# Patient Record
Sex: FEMALE | Race: WHITE | NOT HISPANIC OR LATINO
[De-identification: names, ages, dates, MRNs, and addresses within clinical notes are randomized per-mention and may not be internally consistent; named-entity substitution may affect disease eponyms.]

---

## 2017-02-03 ENCOUNTER — APPOINTMENT (OUTPATIENT)
Dept: OBGYN | Facility: CLINIC | Age: 32
End: 2017-02-03

## 2017-02-13 ENCOUNTER — APPOINTMENT (OUTPATIENT)
Dept: OBGYN | Facility: CLINIC | Age: 32
End: 2017-02-13

## 2017-02-13 VITALS
WEIGHT: 220 LBS | BODY MASS INDEX: 34.53 KG/M2 | HEIGHT: 67 IN | SYSTOLIC BLOOD PRESSURE: 120 MMHG | DIASTOLIC BLOOD PRESSURE: 80 MMHG

## 2017-02-14 LAB — HPV HIGH+LOW RISK DNA PNL CVX: NEGATIVE

## 2017-02-24 LAB — CYTOLOGY CVX/VAG DOC THIN PREP: NORMAL

## 2017-07-28 ENCOUNTER — APPOINTMENT (OUTPATIENT)
Dept: OBGYN | Facility: CLINIC | Age: 32
End: 2017-07-28
Payer: COMMERCIAL

## 2017-07-28 VITALS
BODY MASS INDEX: 32.96 KG/M2 | WEIGHT: 210 LBS | DIASTOLIC BLOOD PRESSURE: 85 MMHG | SYSTOLIC BLOOD PRESSURE: 129 MMHG | HEIGHT: 67 IN | HEART RATE: 88 BPM

## 2017-07-28 PROCEDURE — 99213 OFFICE O/P EST LOW 20 MIN: CPT

## 2017-08-01 ENCOUNTER — APPOINTMENT (OUTPATIENT)
Dept: OBGYN | Facility: CLINIC | Age: 32
End: 2017-08-01
Payer: COMMERCIAL

## 2017-08-01 ENCOUNTER — ASOB RESULT (OUTPATIENT)
Age: 32
End: 2017-08-01

## 2017-08-01 PROCEDURE — 76857 US EXAM PELVIC LIMITED: CPT

## 2017-08-01 PROCEDURE — 76830 TRANSVAGINAL US NON-OB: CPT

## 2017-08-23 ENCOUNTER — APPOINTMENT (OUTPATIENT)
Dept: OBGYN | Facility: CLINIC | Age: 32
End: 2017-08-23
Payer: COMMERCIAL

## 2017-08-23 VITALS
HEIGHT: 67 IN | DIASTOLIC BLOOD PRESSURE: 79 MMHG | SYSTOLIC BLOOD PRESSURE: 123 MMHG | BODY MASS INDEX: 32.96 KG/M2 | HEART RATE: 76 BPM | WEIGHT: 210 LBS

## 2017-08-23 DIAGNOSIS — T83.32XD DISPLACEMENT OF INTRAUTERINE CONTRACEPTIVE DEVICE, SUBSEQUENT ENCOUNTER: ICD-10-CM

## 2017-08-23 PROCEDURE — 58301 REMOVE INTRAUTERINE DEVICE: CPT

## 2017-10-14 ENCOUNTER — TRANSCRIPTION ENCOUNTER (OUTPATIENT)
Age: 32
End: 2017-10-14

## 2017-10-14 ENCOUNTER — EMERGENCY (EMERGENCY)
Facility: HOSPITAL | Age: 32
LOS: 1 days | Discharge: HOME | End: 2017-10-14
Attending: EMERGENCY MEDICINE
Payer: COMMERCIAL

## 2017-10-14 VITALS
WEIGHT: 210.1 LBS | TEMPERATURE: 98 F | DIASTOLIC BLOOD PRESSURE: 84 MMHG | OXYGEN SATURATION: 99 % | RESPIRATION RATE: 20 BRPM | HEART RATE: 74 BPM | HEIGHT: 67 IN | SYSTOLIC BLOOD PRESSURE: 126 MMHG

## 2017-10-14 DIAGNOSIS — Z90.89 ACQUIRED ABSENCE OF OTHER ORGANS: Chronic | ICD-10-CM

## 2017-10-14 DIAGNOSIS — Z98.89 OTHER SPECIFIED POSTPROCEDURAL STATES: Chronic | ICD-10-CM

## 2017-10-14 LAB
ALBUMIN SERPL ELPH-MCNC: 3.8 G/DL — SIGNIFICANT CHANGE UP (ref 3.3–5.2)
ALP SERPL-CCNC: 60 U/L — SIGNIFICANT CHANGE UP (ref 40–120)
ALT FLD-CCNC: 11 U/L — SIGNIFICANT CHANGE UP
ANION GAP SERPL CALC-SCNC: 11 MMOL/L — SIGNIFICANT CHANGE UP (ref 5–17)
AST SERPL-CCNC: 16 U/L — SIGNIFICANT CHANGE UP
BILIRUB SERPL-MCNC: 0.1 MG/DL — LOW (ref 0.4–2)
BUN SERPL-MCNC: 11 MG/DL — SIGNIFICANT CHANGE UP (ref 8–20)
CALCIUM SERPL-MCNC: 8.8 MG/DL — SIGNIFICANT CHANGE UP (ref 8.6–10.2)
CHLORIDE SERPL-SCNC: 106 MMOL/L — SIGNIFICANT CHANGE UP (ref 98–107)
CO2 SERPL-SCNC: 23 MMOL/L — SIGNIFICANT CHANGE UP (ref 22–29)
CREAT SERPL-MCNC: 0.52 MG/DL — SIGNIFICANT CHANGE UP (ref 0.5–1.3)
GLUCOSE SERPL-MCNC: 103 MG/DL — SIGNIFICANT CHANGE UP (ref 70–115)
HCG UR QL: NEGATIVE — SIGNIFICANT CHANGE UP
HCT VFR BLD CALC: 38.2 % — SIGNIFICANT CHANGE UP (ref 37–47)
HGB BLD-MCNC: 12.8 G/DL — SIGNIFICANT CHANGE UP (ref 12–16)
MCHC RBC-ENTMCNC: 28.9 PG — SIGNIFICANT CHANGE UP (ref 27–31)
MCHC RBC-ENTMCNC: 33.5 G/DL — SIGNIFICANT CHANGE UP (ref 32–36)
MCV RBC AUTO: 86.2 FL — SIGNIFICANT CHANGE UP (ref 81–99)
PLATELET # BLD AUTO: 276 K/UL — SIGNIFICANT CHANGE UP (ref 150–400)
POTASSIUM SERPL-MCNC: 4.3 MMOL/L — SIGNIFICANT CHANGE UP (ref 3.5–5.3)
POTASSIUM SERPL-SCNC: 4.3 MMOL/L — SIGNIFICANT CHANGE UP (ref 3.5–5.3)
PROT SERPL-MCNC: 6.8 G/DL — SIGNIFICANT CHANGE UP (ref 6.6–8.7)
RBC # BLD: 4.43 M/UL — SIGNIFICANT CHANGE UP (ref 4.4–5.2)
RBC # FLD: 13.9 % — SIGNIFICANT CHANGE UP (ref 11–15.6)
SODIUM SERPL-SCNC: 140 MMOL/L — SIGNIFICANT CHANGE UP (ref 135–145)
WBC # BLD: 9.2 K/UL — SIGNIFICANT CHANGE UP (ref 4.8–10.8)
WBC # FLD AUTO: 9.2 K/UL — SIGNIFICANT CHANGE UP (ref 4.8–10.8)

## 2017-10-14 PROCEDURE — 99284 EMERGENCY DEPT VISIT MOD MDM: CPT | Mod: 25

## 2017-10-14 PROCEDURE — 96374 THER/PROPH/DIAG INJ IV PUSH: CPT

## 2017-10-14 PROCEDURE — 96375 TX/PRO/DX INJ NEW DRUG ADDON: CPT

## 2017-10-14 PROCEDURE — 80053 COMPREHEN METABOLIC PANEL: CPT

## 2017-10-14 PROCEDURE — 36415 COLL VENOUS BLD VENIPUNCTURE: CPT

## 2017-10-14 PROCEDURE — 81025 URINE PREGNANCY TEST: CPT

## 2017-10-14 PROCEDURE — 85027 COMPLETE CBC AUTOMATED: CPT

## 2017-10-14 RX ORDER — SODIUM CHLORIDE 9 MG/ML
1000 INJECTION INTRAMUSCULAR; INTRAVENOUS; SUBCUTANEOUS ONCE
Qty: 0 | Refills: 0 | Status: COMPLETED | OUTPATIENT
Start: 2017-10-14 | End: 2017-10-14

## 2017-10-14 RX ORDER — KETOROLAC TROMETHAMINE 30 MG/ML
30 SYRINGE (ML) INJECTION ONCE
Qty: 0 | Refills: 0 | Status: DISCONTINUED | OUTPATIENT
Start: 2017-10-14 | End: 2017-10-14

## 2017-10-14 RX ORDER — PROCHLORPERAZINE MALEATE 5 MG
10 TABLET ORAL ONCE
Qty: 0 | Refills: 0 | Status: COMPLETED | OUTPATIENT
Start: 2017-10-14 | End: 2017-10-14

## 2017-10-14 RX ADMIN — Medication 30 MILLIGRAM(S): at 07:47

## 2017-10-14 RX ADMIN — Medication 104 MILLIGRAM(S): at 07:49

## 2017-10-14 RX ADMIN — SODIUM CHLORIDE 1000 MILLILITER(S): 9 INJECTION INTRAMUSCULAR; INTRAVENOUS; SUBCUTANEOUS at 04:55

## 2017-10-14 NOTE — ED ADULT NURSE REASSESSMENT NOTE - NS ED NURSE REASSESS COMMENT FT1
pt received awake and alert sitting up in cart. pt states that the nausea has resolved. pt was given medication and wants to, leave. Dr Bailey  made aware.

## 2017-10-14 NOTE — ED ADULT NURSE NOTE - OBJECTIVE STATEMENT
Assumed pt care @ 0415. Pt sitting in stretcher in NAD. Pt c/o a headache which worsens upon mobility w/ nausea since 2200. Pt took 1gm ibuprofen @ 0000. Pt states this has been going on for years with no results. Denies being dx with migraines. Pt currently appears comfortable. NUNU Minaya @ bedside assessing pt. STACY dao.

## 2017-10-14 NOTE — ED PROVIDER NOTE - NEUROLOGICAL, MLM
No pronator drift. Normal cerebellar function. Alert and oriented, no focal deficits, no motor or sensory deficits.

## 2017-10-14 NOTE — ED PROVIDER NOTE - ATTENDING CONTRIBUTION TO CARE
32y old from home with complaints of headache, no neuro deficits, has had similar headache before, plan hydration, urine, labs, meds and re evaluate

## 2017-10-14 NOTE — ED PROVIDER NOTE - OBJECTIVE STATEMENT
33 y/o female with PMHx depression (on antidepressants) presents to the ED with c/o headache, onset 2200 yesterday. Per pt has had headaches for the past 2 years. Pt states she has had MRI in the past, pt has been evaluated by neurologist and ENT. Attempted to alleviate with NSAIDs without relief. Pt describes headache as pressure like sensation, described as bandlike. Per pt loud sounds exacerbates the pain. Reports chills and nausea, but denies photophobia, changes in vision, recent travel, fever, and any other acute symptoms and complaints at this time.  LMP: 9/22/2017

## 2017-10-14 NOTE — ED PROVIDER NOTE - PROGRESS NOTE DETAILS
pt signed out to me by NUNU Paulino and Dr. Garcia - as per both of them, pt is to be medicated and discharged

## 2017-10-14 NOTE — ED ADULT NURSE NOTE - FAMILY HISTORY
Father  Still living? Yes, Estimated age: Age Unknown  Family history of hypertension in father, Age at diagnosis: Age Unknown

## 2017-10-14 NOTE — ED ADULT NURSE NOTE - PSH
H/O right knee surgery  ACL reconstruction 7/25/16  History of oral surgery  wisdom teeth removed 1997  S/P tonsillectomy  1990

## 2017-10-24 NOTE — ED ADULT NURSE NOTE - CAS TRG GEN SKIN CONDITION
Patient called requesting a medication refill.    omeprazole (PRILOSEC) 20 MG capsule 90 capsule 3 9/22/2017       Last ov (mwv): 9/25/17  Last refill: 9/22/17    Pharmacy has been set up   Warm

## 2018-01-11 ENCOUNTER — TRANSCRIPTION ENCOUNTER (OUTPATIENT)
Age: 33
End: 2018-01-11

## 2018-03-15 ENCOUNTER — APPOINTMENT (OUTPATIENT)
Dept: OBGYN | Facility: CLINIC | Age: 33
End: 2018-03-15
Payer: COMMERCIAL

## 2018-03-15 VITALS
HEIGHT: 67 IN | DIASTOLIC BLOOD PRESSURE: 72 MMHG | SYSTOLIC BLOOD PRESSURE: 118 MMHG | BODY MASS INDEX: 32.96 KG/M2 | WEIGHT: 210 LBS

## 2018-03-15 DIAGNOSIS — Z01.419 ENCOUNTER FOR GYNECOLOGICAL EXAMINATION (GENERAL) (ROUTINE) W/OUT ABNORMAL FINDINGS: ICD-10-CM

## 2018-03-15 PROCEDURE — 99395 PREV VISIT EST AGE 18-39: CPT

## 2018-03-19 LAB
CYTOLOGY CVX/VAG DOC THIN PREP: NORMAL
HPV HIGH+LOW RISK DNA PNL CVX: NOT DETECTED

## 2018-04-09 ENCOUNTER — APPOINTMENT (OUTPATIENT)
Dept: OBGYN | Facility: CLINIC | Age: 33
End: 2018-04-09
Payer: COMMERCIAL

## 2018-04-09 VITALS
HEIGHT: 67 IN | DIASTOLIC BLOOD PRESSURE: 78 MMHG | SYSTOLIC BLOOD PRESSURE: 116 MMHG | WEIGHT: 210 LBS | BODY MASS INDEX: 32.96 KG/M2

## 2018-04-09 DIAGNOSIS — N90.89 OTHER SPECIFIED NONINFLAMMATORY DISORDERS OF VULVA AND PERINEUM: ICD-10-CM

## 2018-04-09 PROCEDURE — 99213 OFFICE O/P EST LOW 20 MIN: CPT

## 2018-09-17 ENCOUNTER — APPOINTMENT (OUTPATIENT)
Dept: OBGYN | Facility: CLINIC | Age: 33
End: 2018-09-17
Payer: COMMERCIAL

## 2018-09-17 DIAGNOSIS — Z32.01 ENCOUNTER FOR PREGNANCY TEST, RESULT POSITIVE: ICD-10-CM

## 2018-09-17 PROCEDURE — 76817 TRANSVAGINAL US OBSTETRIC: CPT

## 2018-09-17 PROCEDURE — 81025 URINE PREGNANCY TEST: CPT

## 2018-09-17 PROCEDURE — 99213 OFFICE O/P EST LOW 20 MIN: CPT

## 2018-09-25 ENCOUNTER — APPOINTMENT (OUTPATIENT)
Dept: OBGYN | Facility: CLINIC | Age: 33
End: 2018-09-25
Payer: COMMERCIAL

## 2018-09-25 ENCOUNTER — NON-APPOINTMENT (OUTPATIENT)
Age: 33
End: 2018-09-25

## 2018-09-25 ENCOUNTER — ASOB RESULT (OUTPATIENT)
Age: 33
End: 2018-09-25

## 2018-09-25 ENCOUNTER — APPOINTMENT (OUTPATIENT)
Dept: ANTEPARTUM | Facility: CLINIC | Age: 33
End: 2018-09-25
Payer: COMMERCIAL

## 2018-09-25 VITALS
SYSTOLIC BLOOD PRESSURE: 110 MMHG | BODY MASS INDEX: 34.84 KG/M2 | DIASTOLIC BLOOD PRESSURE: 64 MMHG | WEIGHT: 222 LBS | HEIGHT: 67 IN

## 2018-09-25 PROCEDURE — 0501F PRENATAL FLOW SHEET: CPT

## 2018-09-25 PROCEDURE — 76817 TRANSVAGINAL US OBSTETRIC: CPT

## 2018-10-05 ENCOUNTER — LABORATORY RESULT (OUTPATIENT)
Age: 33
End: 2018-10-05

## 2018-10-22 ENCOUNTER — APPOINTMENT (OUTPATIENT)
Dept: OBGYN | Facility: CLINIC | Age: 33
End: 2018-10-22
Payer: COMMERCIAL

## 2018-10-22 VITALS
BODY MASS INDEX: 35 KG/M2 | SYSTOLIC BLOOD PRESSURE: 108 MMHG | HEIGHT: 67 IN | DIASTOLIC BLOOD PRESSURE: 74 MMHG | WEIGHT: 223 LBS

## 2018-10-22 DIAGNOSIS — F17.200 NICOTINE DEPENDENCE, UNSPECIFIED, UNCOMPLICATED: ICD-10-CM

## 2018-10-22 DIAGNOSIS — Z86.79 PERSONAL HISTORY OF OTHER DISEASES OF THE CIRCULATORY SYSTEM: ICD-10-CM

## 2018-10-22 DIAGNOSIS — Z20.2 CONTACT WITH AND (SUSPECTED) EXPOSURE TO INFECTIONS WITH A PREDOMINANTLY SEXUAL MODE OF TRANSMISSION: ICD-10-CM

## 2018-10-22 DIAGNOSIS — Z86.59 PERSONAL HISTORY OF OTHER MENTAL AND BEHAVIORAL DISORDERS: ICD-10-CM

## 2018-10-22 PROCEDURE — 0502F SUBSEQUENT PRENATAL CARE: CPT

## 2018-10-22 PROCEDURE — 90686 IIV4 VACC NO PRSV 0.5 ML IM: CPT

## 2018-10-22 PROCEDURE — 90471 IMMUNIZATION ADMIN: CPT

## 2018-10-29 ENCOUNTER — APPOINTMENT (OUTPATIENT)
Dept: ANTEPARTUM | Facility: CLINIC | Age: 33
End: 2018-10-29
Payer: COMMERCIAL

## 2018-10-29 ENCOUNTER — ASOB RESULT (OUTPATIENT)
Age: 33
End: 2018-10-29

## 2018-10-29 PROCEDURE — 76813 OB US NUCHAL MEAS 1 GEST: CPT

## 2018-10-29 PROCEDURE — 36416 COLLJ CAPILLARY BLOOD SPEC: CPT

## 2018-11-01 LAB
1ST TRIMESTER DATA: NORMAL
ADDENDUM DOC: NORMAL
AFP PNL SERPL: NORMAL
AFP SERPL-ACNC: NORMAL
CLINICAL BIOCHEMIST REVIEW: NORMAL
FREE BETA HCG 1ST TRIMESTER: NORMAL
Lab: NORMAL
NOTES NTD: NORMAL
NT: NORMAL
PAPP-A SERPL-ACNC: NORMAL
TRISOMY 18/3: NORMAL

## 2018-11-11 NOTE — ED ADULT NURSE NOTE - NS ED NURSE LEVEL OF CONSCIOUSNESS MENTAL STATUS
Pt resting in bed, reports to have continued dizziness when moving from sitting to standing. Pt c/o increased lower abdominal pain when having to urinate, and will have occasional cramping she has to breathe through. Advised will update MD.   
Alert/Awake

## 2018-11-19 ENCOUNTER — APPOINTMENT (OUTPATIENT)
Dept: OBGYN | Facility: CLINIC | Age: 33
End: 2018-11-19
Payer: COMMERCIAL

## 2018-11-19 VITALS
WEIGHT: 223 LBS | SYSTOLIC BLOOD PRESSURE: 102 MMHG | DIASTOLIC BLOOD PRESSURE: 60 MMHG | HEIGHT: 67 IN | BODY MASS INDEX: 35 KG/M2

## 2018-11-19 PROCEDURE — 0502F SUBSEQUENT PRENATAL CARE: CPT

## 2018-11-30 ENCOUNTER — LABORATORY RESULT (OUTPATIENT)
Age: 33
End: 2018-11-30

## 2018-12-17 ENCOUNTER — APPOINTMENT (OUTPATIENT)
Dept: ANTEPARTUM | Facility: CLINIC | Age: 33
End: 2018-12-17
Payer: COMMERCIAL

## 2018-12-17 ENCOUNTER — ASOB RESULT (OUTPATIENT)
Age: 33
End: 2018-12-17

## 2018-12-17 ENCOUNTER — APPOINTMENT (OUTPATIENT)
Dept: OBGYN | Facility: CLINIC | Age: 33
End: 2018-12-17
Payer: COMMERCIAL

## 2018-12-17 VITALS
DIASTOLIC BLOOD PRESSURE: 70 MMHG | WEIGHT: 226 LBS | BODY MASS INDEX: 35.47 KG/M2 | SYSTOLIC BLOOD PRESSURE: 110 MMHG | HEIGHT: 67 IN

## 2018-12-17 PROCEDURE — 76817 TRANSVAGINAL US OBSTETRIC: CPT

## 2018-12-17 PROCEDURE — 59425 ANTEPARTUM CARE ONLY: CPT

## 2018-12-17 PROCEDURE — 0502F SUBSEQUENT PRENATAL CARE: CPT

## 2018-12-17 PROCEDURE — 76811 OB US DETAILED SNGL FETUS: CPT

## 2019-01-09 ENCOUNTER — APPOINTMENT (OUTPATIENT)
Dept: PEDIATRIC CARDIOLOGY | Facility: CLINIC | Age: 34
End: 2019-01-09

## 2019-01-14 ENCOUNTER — APPOINTMENT (OUTPATIENT)
Dept: OBGYN | Facility: CLINIC | Age: 34
End: 2019-01-14

## 2019-02-11 ENCOUNTER — APPOINTMENT (OUTPATIENT)
Dept: ANTEPARTUM | Facility: CLINIC | Age: 34
End: 2019-02-11

## 2019-08-13 ENCOUNTER — APPOINTMENT (OUTPATIENT)
Dept: OTOLARYNGOLOGY | Facility: CLINIC | Age: 34
End: 2019-08-13
Payer: COMMERCIAL

## 2019-08-13 VITALS
SYSTOLIC BLOOD PRESSURE: 126 MMHG | DIASTOLIC BLOOD PRESSURE: 68 MMHG | HEART RATE: 94 BPM | WEIGHT: 226 LBS | HEIGHT: 67 IN | BODY MASS INDEX: 35.47 KG/M2

## 2019-08-13 PROCEDURE — 99213 OFFICE O/P EST LOW 20 MIN: CPT

## 2019-08-13 RX ORDER — LEVONORGESTREL AND ETHINYL ESTRADIOL 0.1-0.02MG
0.1-2 KIT ORAL DAILY
Qty: 90 | Refills: 3 | Status: COMPLETED | COMMUNITY
Start: 2018-03-15 | End: 2019-08-13

## 2019-08-13 NOTE — HISTORY OF PRESENT ILLNESS
[de-identified] : Hx of L buccal dysplasia.  Pt states the L buccal area is feeling worse.  She feels pain when eating hard foods.  She first noticed this during her recent pregnancy.  The area gets more tender after brushing and flossing her teeth.  She denies any dyspnea, dysphagia, otalgia.

## 2019-08-13 NOTE — PHYSICAL EXAM
[FreeTextEntry1] : Lichenoid changes along the L. maxillary alveolus and mandibular alveolus.  The areas are flat, not ulcerated and with no TTP. [Normal] : no rashes

## 2019-08-13 NOTE — DATA REVIEWED
[de-identified] : Pathology from 2016 reported squamous mucosa with hyperkeratosis, mild dysplasia and lichenoid mucositis. \par

## 2019-10-28 ENCOUNTER — APPOINTMENT (OUTPATIENT)
Dept: OTOLARYNGOLOGY | Facility: CLINIC | Age: 34
End: 2019-10-28
Payer: COMMERCIAL

## 2019-10-28 VITALS
HEART RATE: 64 BPM | TEMPERATURE: 98 F | BODY MASS INDEX: 35.36 KG/M2 | WEIGHT: 225.3 LBS | HEIGHT: 67 IN | SYSTOLIC BLOOD PRESSURE: 118 MMHG | DIASTOLIC BLOOD PRESSURE: 80 MMHG

## 2019-10-28 VITALS
HEIGHT: 67 IN | DIASTOLIC BLOOD PRESSURE: 80 MMHG | BODY MASS INDEX: 35.31 KG/M2 | SYSTOLIC BLOOD PRESSURE: 118 MMHG | HEART RATE: 64 BPM | WEIGHT: 225 LBS | TEMPERATURE: 98.3 F

## 2019-10-28 PROCEDURE — 99214 OFFICE O/P EST MOD 30 MIN: CPT

## 2019-10-28 NOTE — HISTORY OF PRESENT ILLNESS
[de-identified] : Hx of L buccal dysplasia. Pt states the L buccal area is feeling worse. She feels pain when eating hard foods. She first noticed this during her recent pregnancy. The area gets more tender after brushing and flossing her teeth. She denies any dyspnea, dysphagia, otalgia.  She was evaluated by Dr. Noe and biopsy showed moderate dysplasia.  She return to discuss reexcision of this area.\par

## 2019-11-14 ENCOUNTER — OUTPATIENT (OUTPATIENT)
Dept: OUTPATIENT SERVICES | Facility: HOSPITAL | Age: 34
LOS: 1 days | End: 2019-11-14
Payer: COMMERCIAL

## 2019-11-14 VITALS
TEMPERATURE: 97 F | HEART RATE: 63 BPM | WEIGHT: 225.09 LBS | OXYGEN SATURATION: 98 % | SYSTOLIC BLOOD PRESSURE: 126 MMHG | DIASTOLIC BLOOD PRESSURE: 74 MMHG | HEIGHT: 67 IN | RESPIRATION RATE: 16 BRPM

## 2019-11-14 DIAGNOSIS — K13.70 UNSPECIFIED LESIONS OF ORAL MUCOSA: ICD-10-CM

## 2019-11-14 DIAGNOSIS — Z98.89 OTHER SPECIFIED POSTPROCEDURAL STATES: Chronic | ICD-10-CM

## 2019-11-14 DIAGNOSIS — Z87.19 PERSONAL HISTORY OF OTHER DISEASES OF THE DIGESTIVE SYSTEM: Chronic | ICD-10-CM

## 2019-11-14 DIAGNOSIS — Z90.89 ACQUIRED ABSENCE OF OTHER ORGANS: Chronic | ICD-10-CM

## 2019-11-14 LAB
ANION GAP SERPL CALC-SCNC: 15 MMO/L — HIGH (ref 7–14)
BUN SERPL-MCNC: 16 MG/DL — SIGNIFICANT CHANGE UP (ref 7–23)
CALCIUM SERPL-MCNC: 9.6 MG/DL — SIGNIFICANT CHANGE UP (ref 8.4–10.5)
CHLORIDE SERPL-SCNC: 98 MMOL/L — SIGNIFICANT CHANGE UP (ref 98–107)
CO2 SERPL-SCNC: 23 MMOL/L — SIGNIFICANT CHANGE UP (ref 22–31)
CREAT SERPL-MCNC: 0.69 MG/DL — SIGNIFICANT CHANGE UP (ref 0.5–1.3)
GLUCOSE SERPL-MCNC: 79 MG/DL — SIGNIFICANT CHANGE UP (ref 70–99)
HCT VFR BLD CALC: 43.9 % — SIGNIFICANT CHANGE UP (ref 34.5–45)
HGB BLD-MCNC: 14.5 G/DL — SIGNIFICANT CHANGE UP (ref 11.5–15.5)
MCHC RBC-ENTMCNC: 29.4 PG — SIGNIFICANT CHANGE UP (ref 27–34)
MCHC RBC-ENTMCNC: 33 % — SIGNIFICANT CHANGE UP (ref 32–36)
MCV RBC AUTO: 89 FL — SIGNIFICANT CHANGE UP (ref 80–100)
NRBC # FLD: 0 K/UL — SIGNIFICANT CHANGE UP (ref 0–0)
PLATELET # BLD AUTO: 311 K/UL — SIGNIFICANT CHANGE UP (ref 150–400)
PMV BLD: 10.5 FL — SIGNIFICANT CHANGE UP (ref 7–13)
POTASSIUM SERPL-MCNC: 3.7 MMOL/L — SIGNIFICANT CHANGE UP (ref 3.5–5.3)
POTASSIUM SERPL-SCNC: 3.7 MMOL/L — SIGNIFICANT CHANGE UP (ref 3.5–5.3)
RBC # BLD: 4.93 M/UL — SIGNIFICANT CHANGE UP (ref 3.8–5.2)
RBC # FLD: 13.5 % — SIGNIFICANT CHANGE UP (ref 10.3–14.5)
SODIUM SERPL-SCNC: 136 MMOL/L — SIGNIFICANT CHANGE UP (ref 135–145)
WBC # BLD: 9.25 K/UL — SIGNIFICANT CHANGE UP (ref 3.8–10.5)
WBC # FLD AUTO: 9.25 K/UL — SIGNIFICANT CHANGE UP (ref 3.8–10.5)

## 2019-11-14 PROCEDURE — 93010 ELECTROCARDIOGRAM REPORT: CPT

## 2019-11-14 RX ORDER — SODIUM CHLORIDE 9 MG/ML
1000 INJECTION, SOLUTION INTRAVENOUS
Refills: 0 | Status: DISCONTINUED | OUTPATIENT
Start: 2019-11-20 | End: 2019-12-18

## 2019-11-14 NOTE — H&P PST ADULT - NSICDXPASTSURGICALHX_GEN_ALL_CORE_FT
PAST SURGICAL HISTORY:  H/O right knee surgery ACL reconstruction 7/25/16    History of oral lesions 2016    History of oral surgery wisdom teeth removed 1997    S/P tonsillectomy 1990

## 2019-11-14 NOTE — H&P PST ADULT - NEGATIVE BREAST SYMPTOMS
no breast tenderness L/no nipple discharge R/no breast lump L/no breast tenderness R/no nipple discharge L

## 2019-11-14 NOTE — H&P PST ADULT - NSICDXFAMILYHX_GEN_ALL_CORE_FT
FAMILY HISTORY:  Father  Still living? Yes, Estimated age: Age Unknown  Family history of hypertension in father, Age at diagnosis: Age Unknown

## 2019-11-14 NOTE — H&P PST ADULT - NEGATIVE FEMALE-SPECIFIC SYMPTOMS
no irregular menses/no pelvic pain/no amenorrhea/no dysmenorrhea/no menorrhagia/no abnormal vaginal bleeding

## 2019-11-14 NOTE — H&P PST ADULT - NEGATIVE GENERAL GENITOURINARY SYMPTOMS
no renal colic/no flank pain L/no dysuria/no urine discoloration/no bladder infections/no urinary hesitancy/no nocturia/normal urinary frequency/no hematuria/no incontinence/no flank pain R

## 2019-11-14 NOTE — H&P PST ADULT - NEGATIVE ENMT SYMPTOMS
no nasal discharge/no hearing difficulty/no sinus symptoms/no nose bleeds/no vertigo/no nasal congestion/no ear pain/no tinnitus/no dysphagia/no gum bleeding/no throat pain

## 2019-11-14 NOTE — H&P PST ADULT - NEGATIVE NEUROLOGICAL SYMPTOMS
no tremors/no facial palsy/no paresthesias/no confusion/no transient paralysis/no weakness/no syncope/no headache/no focal seizures/no generalized seizures/no vertigo/no loss of sensation/no loss of consciousness/no hemiparesis/no difficulty walking

## 2019-11-14 NOTE — H&P PST ADULT - NEGATIVE GENERAL SYMPTOMS
no sweating/no anorexia/no chills/no weight loss/no polyphagia/no fatigue/no weight gain/no polyuria/no polydipsia/no malaise/no fever

## 2019-11-14 NOTE — H&P PST ADULT - NSICDXPROBLEM_GEN_ALL_CORE_FT
PROBLEM DIAGNOSES  Problem: Unspecified lesions of oral mucosa  Assessment and Plan:  Scheduled for direct laryngoscopy, esophagoscopy resection of oral lesion on 11/20/19  preop instructions, gi prophylaxis given  pt verbalized understanding  ucg on admit

## 2019-11-14 NOTE — H&P PST ADULT - NEGATIVE OPHTHALMOLOGIC SYMPTOMS
no lacrimation L/no lacrimation R/no diplopia/no discharge R/no irritation L/no loss of vision R/no scleral injection R/no blurred vision R/no blurred vision L/no photophobia/no pain R/no irritation R/no loss of vision L/no scleral injection L/no discharge L/no pain L

## 2019-11-14 NOTE — H&P PST ADULT - NEGATIVE MUSCULOSKELETAL SYMPTOMS
no leg pain L/no muscle weakness/no joint swelling/no back pain/no arthritis/no arm pain R/no myalgia/no muscle cramps/no neck pain/no arm pain L

## 2019-11-14 NOTE — H&P PST ADULT - HISTORY OF PRESENT ILLNESS
34 yr old obese female, h/o depression, presents for pre-op exam for recurrent oral lesion.   H/o excision of oral lesion, possible skin graft on 9/22/16.   Pt reports h/o oral lesion upper left jaw for the past year, c/o mild discomfort.  Scheduled for direct laryngoscopy, esophagoscopy resection of oral lesion on 11/20/19

## 2019-11-19 ENCOUNTER — TRANSCRIPTION ENCOUNTER (OUTPATIENT)
Age: 34
End: 2019-11-19

## 2019-11-19 NOTE — ASU PATIENT PROFILE, ADULT - PSH
H/O right knee surgery  ACL reconstruction 7/25/16  History of oral lesions  2016  History of oral surgery  wisdom teeth removed 1997  S/P tonsillectomy  1990

## 2019-11-20 ENCOUNTER — OUTPATIENT (OUTPATIENT)
Dept: OUTPATIENT SERVICES | Facility: HOSPITAL | Age: 34
LOS: 1 days | Discharge: HOME | End: 2019-11-20
Payer: COMMERCIAL

## 2019-11-20 ENCOUNTER — APPOINTMENT (OUTPATIENT)
Dept: OTOLARYNGOLOGY | Facility: HOSPITAL | Age: 34
End: 2019-11-20

## 2019-11-20 VITALS
HEIGHT: 67 IN | SYSTOLIC BLOOD PRESSURE: 128 MMHG | OXYGEN SATURATION: 97 % | HEART RATE: 62 BPM | RESPIRATION RATE: 15 BRPM | WEIGHT: 225.09 LBS | DIASTOLIC BLOOD PRESSURE: 51 MMHG | TEMPERATURE: 98 F

## 2019-11-20 VITALS
SYSTOLIC BLOOD PRESSURE: 129 MMHG | RESPIRATION RATE: 17 BRPM | OXYGEN SATURATION: 96 % | HEART RATE: 67 BPM | DIASTOLIC BLOOD PRESSURE: 59 MMHG

## 2019-11-20 DIAGNOSIS — K13.70 UNSPECIFIED LESIONS OF ORAL MUCOSA: ICD-10-CM

## 2019-11-20 DIAGNOSIS — Z90.89 ACQUIRED ABSENCE OF OTHER ORGANS: Chronic | ICD-10-CM

## 2019-11-20 DIAGNOSIS — Z87.19 PERSONAL HISTORY OF OTHER DISEASES OF THE DIGESTIVE SYSTEM: Chronic | ICD-10-CM

## 2019-11-20 DIAGNOSIS — Z98.89 OTHER SPECIFIED POSTPROCEDURAL STATES: Chronic | ICD-10-CM

## 2019-11-20 LAB — HCG UR QL: NEGATIVE — SIGNIFICANT CHANGE UP

## 2019-11-20 PROCEDURE — 31525 DX LARYNGOSCOPY EXCL NB: CPT | Mod: GC,59

## 2019-11-20 PROCEDURE — 40810 EXCISION OF MOUTH LESION: CPT | Mod: GC

## 2019-11-20 PROCEDURE — 43191 ESOPHAGOSCOPY RIGID TRNSO DX: CPT | Mod: GC

## 2019-11-20 RX ORDER — ONDANSETRON 8 MG/1
4 TABLET, FILM COATED ORAL ONCE
Refills: 0 | Status: DISCONTINUED | OUTPATIENT
Start: 2019-11-20 | End: 2019-12-18

## 2019-11-20 RX ORDER — OXYCODONE HYDROCHLORIDE 5 MG/1
5 TABLET ORAL ONCE
Refills: 0 | Status: DISCONTINUED | OUTPATIENT
Start: 2019-11-20 | End: 2019-11-20

## 2019-11-20 RX ORDER — CHLORHEXIDINE GLUCONATE 213 G/1000ML
5 SOLUTION TOPICAL
Qty: 200 | Refills: 0
Start: 2019-11-20

## 2019-11-20 RX ORDER — FENTANYL CITRATE 50 UG/ML
25 INJECTION INTRAVENOUS
Refills: 0 | Status: DISCONTINUED | OUTPATIENT
Start: 2019-11-20 | End: 2019-11-20

## 2019-11-20 NOTE — ASU DISCHARGE PLAN (ADULT/PEDIATRIC) - CALL YOUR DOCTOR IF YOU HAVE ANY OF THE FOLLOWING:
Bleeding that does not stop Pain not relieved by Medications/Bleeding that does not stop/Inability to tolerate liquids or foods/Fever greater than (need to indicate Fahrenheit or Celsius)

## 2019-11-20 NOTE — ASU DISCHARGE PLAN (ADULT/PEDIATRIC) - CARE PROVIDER_API CALL
Lowell Garcia)  Otolaryngology  [address] [place] [address]  Phone: [phone]  Fax: [phone]  Follow Up Time:

## 2019-11-20 NOTE — ASU DISCHARGE PLAN (ADULT/PEDIATRIC) - ASU DC SPECIAL INSTRUCTIONSFT
diet as described above  percocet as needed for pain  peridex mouth rinse three times a day  follow up in clinic next week as scheduled [phone]

## 2019-11-21 ENCOUNTER — RESULT REVIEW (OUTPATIENT)
Age: 34
End: 2019-11-21

## 2019-11-21 PROCEDURE — 88305 TISSUE EXAM BY PATHOLOGIST: CPT | Mod: 26

## 2019-11-25 ENCOUNTER — APPOINTMENT (OUTPATIENT)
Dept: OTOLARYNGOLOGY | Facility: CLINIC | Age: 34
End: 2019-11-25
Payer: COMMERCIAL

## 2019-11-25 VITALS
HEIGHT: 67 IN | HEART RATE: 58 BPM | DIASTOLIC BLOOD PRESSURE: 74 MMHG | WEIGHT: 225 LBS | SYSTOLIC BLOOD PRESSURE: 111 MMHG | BODY MASS INDEX: 35.31 KG/M2

## 2019-11-25 PROCEDURE — 99024 POSTOP FOLLOW-UP VISIT: CPT

## 2019-11-25 NOTE — HISTORY OF PRESENT ILLNESS
[de-identified] : Hx of L buccal dysplasia. Pt states the L buccal area is feeling worse. She feels pain when eating hard foods. She first noticed this during her recent pregnancy. The area gets more tender after brushing and flossing her teeth. She was evaluated by Dr. Noe and biopsy showed moderate dysplasia.  pt is here post reexcision of oral lesion on 11/20/2019 and pt has no c.o post procedure.  She denies any dyspnea, dysphagia, otalgia

## 2019-11-25 NOTE — DATA REVIEWED
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

## 2019-11-25 NOTE — PHYSICAL EXAM
[Normal] : orientation to person, place, and time: normal [FreeTextEntry1] : Lichenoid changes along the L. mandibular alveolus.  The area of recent excision along the L. maxillary gingiva are healing well.  No new lesions.  The areas are flat, not ulcerated and with no TTP.

## 2019-11-27 NOTE — H&P PST ADULT - NS PRO FEM REPRO HEALTH SCREEN
Spoke with patient she was notified of refill. Also booked next appointment for Dec 20th.   breast self-exam

## 2020-03-31 ENCOUNTER — APPOINTMENT (OUTPATIENT)
Dept: OTOLARYNGOLOGY | Facility: CLINIC | Age: 35
End: 2020-03-31

## 2020-05-03 ENCOUNTER — APPOINTMENT (OUTPATIENT)
Dept: OTOLARYNGOLOGY | Facility: CLINIC | Age: 35
End: 2020-05-03

## 2020-05-05 ENCOUNTER — TRANSCRIPTION ENCOUNTER (OUTPATIENT)
Age: 35
End: 2020-05-05

## 2020-05-07 ENCOUNTER — APPOINTMENT (OUTPATIENT)
Dept: NEUROSURGERY | Facility: CLINIC | Age: 35
End: 2020-05-07

## 2020-09-08 ENCOUNTER — APPOINTMENT (OUTPATIENT)
Dept: OTOLARYNGOLOGY | Facility: CLINIC | Age: 35
End: 2020-09-08
Payer: COMMERCIAL

## 2020-09-08 VITALS
SYSTOLIC BLOOD PRESSURE: 130 MMHG | HEIGHT: 67 IN | DIASTOLIC BLOOD PRESSURE: 84 MMHG | WEIGHT: 225 LBS | HEART RATE: 66 BPM | BODY MASS INDEX: 35.31 KG/M2 | TEMPERATURE: 98.2 F

## 2020-09-08 PROCEDURE — 99214 OFFICE O/P EST MOD 30 MIN: CPT | Mod: 25

## 2020-09-08 PROCEDURE — 31575 DIAGNOSTIC LARYNGOSCOPY: CPT

## 2020-09-08 RX ORDER — LEVONORGESTREL AND ETHINYL ESTRADIOL 0.1-0.02MG
0.1-2 KIT ORAL DAILY
Qty: 90 | Refills: 3 | Status: COMPLETED | COMMUNITY
Start: 2017-08-23 | End: 2020-09-08

## 2020-09-08 NOTE — PHYSICAL EXAM
[Laryngoscopy Performed] : laryngoscopy was performed, see procedure section for findings [FreeTextEntry1] : Lichenoid changes along the L. mandibular alveolus.  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   [Normal] : no rashes

## 2020-09-08 NOTE — PROCEDURE
[Unable to Cooperate with Mirror] : patient unable to cooperate with mirror [None] : none [Flexible Endoscope] : examined with the flexible endoscope [Serial Number: ___] : Serial Number: [unfilled] [de-identified] : No lesions in the NPx, OPx, HPx or larynx.  VC are mobile, airway patent.\par

## 2020-09-08 NOTE — HISTORY OF PRESENT ILLNESS
[de-identified] : Hx of L buccal dysplasia. Pt states the L buccal area is feeling worse. She feels pain when eating hard foods. She first noticed this during her recent pregnancy. The area gets more tender after brushing and flossing her teeth. She was evaluated by Dr. Noe and biopsy showed moderate dysplasia.  pt is here post reexcision of oral lesion on 11/20/2019 atypical verrucous hyperplasia and today f/u , c.o more pain left side of the mouth and slow increase in size of the lesion about 4 - 5 months, no bleeding.  She denies any dyspnea, dysphagia, otalgia

## 2020-09-12 DIAGNOSIS — Z01.818 ENCOUNTER FOR OTHER PREPROCEDURAL EXAMINATION: ICD-10-CM

## 2020-09-14 ENCOUNTER — APPOINTMENT (OUTPATIENT)
Dept: DISASTER EMERGENCY | Facility: CLINIC | Age: 35
End: 2020-09-14

## 2020-09-15 ENCOUNTER — OUTPATIENT (OUTPATIENT)
Dept: OUTPATIENT SERVICES | Facility: HOSPITAL | Age: 35
LOS: 1 days | End: 2020-09-15

## 2020-09-15 VITALS
TEMPERATURE: 98 F | OXYGEN SATURATION: 98 % | SYSTOLIC BLOOD PRESSURE: 131 MMHG | HEART RATE: 75 BPM | DIASTOLIC BLOOD PRESSURE: 71 MMHG | RESPIRATION RATE: 14 BRPM | HEIGHT: 68 IN | WEIGHT: 231.93 LBS

## 2020-09-15 DIAGNOSIS — Z90.89 ACQUIRED ABSENCE OF OTHER ORGANS: Chronic | ICD-10-CM

## 2020-09-15 DIAGNOSIS — Z98.890 OTHER SPECIFIED POSTPROCEDURAL STATES: Chronic | ICD-10-CM

## 2020-09-15 DIAGNOSIS — L43.9 LICHEN PLANUS, UNSPECIFIED: ICD-10-CM

## 2020-09-15 DIAGNOSIS — K13.70 UNSPECIFIED LESIONS OF ORAL MUCOSA: ICD-10-CM

## 2020-09-15 DIAGNOSIS — Z98.89 OTHER SPECIFIED POSTPROCEDURAL STATES: Chronic | ICD-10-CM

## 2020-09-15 DIAGNOSIS — Z11.59 ENCOUNTER FOR SCREENING FOR OTHER VIRAL DISEASES: ICD-10-CM

## 2020-09-15 DIAGNOSIS — Z91.040 LATEX ALLERGY STATUS: ICD-10-CM

## 2020-09-15 DIAGNOSIS — Z87.19 PERSONAL HISTORY OF OTHER DISEASES OF THE DIGESTIVE SYSTEM: Chronic | ICD-10-CM

## 2020-09-15 LAB
BLD GP AB SCN SERPL QL: NEGATIVE — SIGNIFICANT CHANGE UP
HCG UR-SCNC: NEGATIVE — SIGNIFICANT CHANGE UP
HCT VFR BLD CALC: 44.3 % — SIGNIFICANT CHANGE UP (ref 34.5–45)
HGB BLD-MCNC: 14.1 G/DL — SIGNIFICANT CHANGE UP (ref 11.5–15.5)
MCHC RBC-ENTMCNC: 28.9 PG — SIGNIFICANT CHANGE UP (ref 27–34)
MCHC RBC-ENTMCNC: 31.8 % — LOW (ref 32–36)
MCV RBC AUTO: 90.8 FL — SIGNIFICANT CHANGE UP (ref 80–100)
NRBC # FLD: 0 K/UL — SIGNIFICANT CHANGE UP (ref 0–0)
PLATELET # BLD AUTO: 299 K/UL — SIGNIFICANT CHANGE UP (ref 150–400)
PMV BLD: 11 FL — SIGNIFICANT CHANGE UP (ref 7–13)
RBC # BLD: 4.88 M/UL — SIGNIFICANT CHANGE UP (ref 3.8–5.2)
RBC # FLD: 13.4 % — SIGNIFICANT CHANGE UP (ref 10.3–14.5)
RH IG SCN BLD-IMP: POSITIVE — SIGNIFICANT CHANGE UP
SARS-COV-2 N GENE NPH QL NAA+PROBE: NOT DETECTED
SP GR UR: 1 — LOW (ref 1–1.04)
WBC # BLD: 12.75 K/UL — HIGH (ref 3.8–10.5)
WBC # FLD AUTO: 12.75 K/UL — HIGH (ref 3.8–10.5)

## 2020-09-15 RX ORDER — SODIUM CHLORIDE 9 MG/ML
1000 INJECTION, SOLUTION INTRAVENOUS
Refills: 0 | Status: DISCONTINUED | OUTPATIENT
Start: 2020-09-17 | End: 2020-10-01

## 2020-09-15 NOTE — H&P PST ADULT - NEGATIVE MUSCULOSKELETAL SYMPTOMS
no leg pain L/no joint swelling/no muscle weakness/no muscle cramps/no back pain/no arthritis/no neck pain/no arm pain L/no arm pain R/no myalgia

## 2020-09-15 NOTE — H&P PST ADULT - VISION (WITH CORRECTIVE LENSES IF THE PATIENT USUALLY WEARS THEM):
Section Delivery Procedure Note      Name: Donna Perez   Medical Record Number: [id number]   Today's Date: 2017      Preoperative Diagnosis: 39w5d, meconium desires repeat  Section, multiple uterine fibroids  Postoperative Diagnosis: Same  Procedure: LTCS, lysis of adhesions  Surgeon:Lyn Ward MD    Assistant:  Eleazar Baca CSA    Anesthesia: Epidural    EBL: 400 ml    IV Fluids:  2000ml  Urine Output:  400ml    Prophylactic Antibiotics: Ancef     Fetal Description: bishop male    Birth Information:   Information for the patient's :  Silvestre Gitelman [[id number]]   One Minute Apgar: 9 (Filed from Delivery Summary)  Five  Minute Apgar: 9 (Filed from Delivery Summary)       Specimens: cord blood           Complications:  none    Operative Findings: At the time of the surgery a live Male delivered  with an APGAR of  9 and 9 at 1 and 5 minutes respectively. Birth weight was 3040gm  Cord had 3 vessels. Inspection of the uterus showed multiple uterine fibroids , largest intramural at anterior uterine wall 5-6 cm, fallopian tubes and ovaries revealed normal anatomy. Filmy adhesions of peritoneal to the anterior uterine wall . Adhesion of omentum to the fundus of uterus at the area of subserosal fibroid (2-3 cm)  Procedure Details: The patient was seen in Labor and Delivery. The risks, benefits, complications, treatment options, and expected outcomes were discussed with the patient. The patient concurred with the proposed plan, giving informed consent. The patient was taken to the operating room, where spinal anesthesia was administered and found to be adequate. Cunningham catheter had been placed using sterile technique. The patient is in the supine wedged position. The patient was prepped and draped in the normal sterile fashion. Bilatereal sequential compression stockings were placed to the knee.  A time out was performed and the patient was  identified as [facility] and the procedure verified as  Delivery. Allergies and antibiotics given were also verified. A  skin incision was made at the area of previous scar above the symphysis pubis. The incision was carried down to the fascia. The fascia was opened in the midline with sharp dissection. The rectus muscle was divided bluntly. The peritoneum was entered with good visualization of underlying structures. The bladder blade was inserted. The vesicouterine peritoneum was incised in a semi lunar fashion and the bladder flap was created using blunt and sharp dissection. The uterus was scored in the lower uterine segment and then entered in the midline. The uterine incision was extended bilaterally, transversly. Amniotomy was performed and the fluid was small amount thick meconium. A hand was inserted in the uterus and the baby was in the cephalic presentation. The babys head was then delivered atraumatically. The nose, mouth and hypopharynx were suctioned. The cord was clamped and cut and the baby was handed off to the waiting pediatric staff. Cord blood was obtained. Placenta and membranes were delivered manually from the uterus. The uterus was cleared of all clot and debris. The uterus was then exteriorized and the uterine incision was closed with 0 Vycril in a running locked fashion. Interrupted stitches of 2-0 Vycril used to ontained hemostasis . The uterus was returned to the abdomen. Pelvis was irrigated and all clots removed. There was good hemostasis of the rectus muscles. Parietal peritoneum was re approximated  with 2-0 Vycril in a running fashion. The fascia was closed in a running fashion using 0 Vicryl with two sutures tied separately in the midline. The subcutaneous tissue was irrigated and closed with 3.0 Vycril in a continuous fashion. The skin was closed with 4-0 Monocryl in a subcuticular fashion. Dermabond was applied. The patient tolerated the procedure well.  Sponge, lap, and needle counts were correct times three and the patient and baby were taken to recovery/postpartum room in stable condition.       Signed: Starla Davis MD      March 2, 2017 Normal vision: sees adequately in most situations; can see medication labels, newsprint/glasses

## 2020-09-15 NOTE — H&P PST ADULT - LAST ECHOCARDIOGRAM
Pt to ed for evaluation of painful rash to the L flank. PT states he had pain Friday and noticed a rash developing on Saturday.
age 17 h/o palpitations

## 2020-09-15 NOTE — H&P PST ADULT - NEGATIVE BREAST SYMPTOMS
no nipple discharge R/no breast tenderness L/no breast lump L/no nipple discharge L/no breast tenderness R

## 2020-09-15 NOTE — H&P PST ADULT - NSICDXPASTSURGICALHX_GEN_ALL_CORE_FT
PAST SURGICAL HISTORY:  H/O rectocele repair 12/2019    H/O right knee surgery ACL reconstruction 7/25/16    History of oral lesions 2016    History of oral surgery wisdom teeth removed 1997    S/P tonsillectomy 1990     PAST SURGICAL HISTORY:  H/O rectocele repair 12/2019    H/O right knee surgery ACL reconstruction 7/25/16    History of oral lesions 2016 & 2019    History of oral surgery wisdom teeth removed 1997    S/P tonsillectomy 1990

## 2020-09-15 NOTE — H&P PST ADULT - NSICDXPROBLEM_GEN_ALL_CORE_FT
PROBLEM DIAGNOSES  Problem: Lichen planus  Assessment and Plan: scheduled direct laryngoscopy, esophagoscopy, excision of maxillary aveolar and buccal lesion, repair with skin graft on 9/17/2020  Written & verbal preop instructions, gi prophylaxis   Pt verbalized good understanding.    ucg abo on admit    Problem: Encounter for laboratory testing for COVID-19 virus  Assessment and Plan: Done 9/14/2020 per pt        PROBLEM DIAGNOSES  Problem: Latex allergy  Assessment and Plan: OR booking notified via fax     Problem: Lichen planus  Assessment and Plan: scheduled direct laryngoscopy, esophagoscopy, excision of maxillary aveolar and buccal lesion, repair with skin graft on 9/17/2020  Written & verbal preop instructions, gi prophylaxis   Pt verbalized good understanding.    ucg abo on admit    Problem: Encounter for laboratory testing for COVID-19 virus  Assessment and Plan: Done 9/14/2020 per pt

## 2020-09-15 NOTE — H&P PST ADULT - NEGATIVE NEUROLOGICAL SYMPTOMS
no focal seizures/no generalized seizures/no difficulty walking/no syncope/no tremors/no transient paralysis/no weakness/no paresthesias/no headache

## 2020-09-15 NOTE — H&P PST ADULT - NEGATIVE CARDIOVASCULAR SYMPTOMS
no chest pain/no dyspnea on exertion/no palpitations/no peripheral edema no chest pain/no peripheral edema/no orthopnea/no paroxysmal nocturnal dyspnea/no palpitations/no dyspnea on exertion

## 2020-09-15 NOTE — H&P PST ADULT - NEGATIVE FEMALE-SPECIFIC SYMPTOMS
no irregular menses/no menorrhagia/no abnormal vaginal bleeding/no dysmenorrhea/no amenorrhea/no pelvic pain

## 2020-09-15 NOTE — H&P PST ADULT - NEGATIVE OPHTHALMOLOGIC SYMPTOMS
no blurred vision R/no diplopia/no photophobia/no lacrimation L/no lacrimation R/no blurred vision L/no pain L

## 2020-09-15 NOTE — H&P PST ADULT - NEGATIVE GENERAL GENITOURINARY SYMPTOMS
no flank pain L/no hematuria/no nocturia/no urinary hesitancy/no urine discoloration/no bladder infections/no renal colic/no dysuria/no flank pain R/no incontinence/normal urinary frequency

## 2020-09-15 NOTE — H&P PST ADULT - MALLAMPATI CLASS
Class III - visualization of the soft palate and the base of the uvula with phonation/Class II - visualization of the soft palate, fauces, and uvula

## 2020-09-15 NOTE — H&P PST ADULT - NSICDXPASTMEDICALHX_GEN_ALL_CORE_FT
PAST MEDICAL HISTORY:  Depression     Lichen planus h/o     PAST MEDICAL HISTORY:  Depression     Lichen planus h/o    Obesity

## 2020-09-15 NOTE — H&P PST ADULT - NEGATIVE ENMT SYMPTOMS
no hearing difficulty/no ear pain/no gum bleeding/no tinnitus/no throat pain/no nasal congestion/no nasal discharge/no nose bleeds/no dysphagia/no vertigo

## 2020-09-15 NOTE — H&P PST ADULT - HISTORY OF PRESENT ILLNESS
34 yr old obese female, h/o depression, presents for pre-op exam for recurrent oral lesion.   H/o excision of oral lesion, possible skin graft on 9/22/16.   Pt reports h/o oral lesion upper left jaw for the past year, c/o mild discomfort.  Scheduled for direct laryngoscopy, esophagoscopy resection of oral lesion on 11/20/19 35 yr old obese female, h/o depression, presents for pre-op exam for recurrent oral lesion.   H/o excision of oral lesion, possible skin graft on 9/22/16 and resection of oral lesion on 11/20/19. Pt present for preop eval for scheduled direct laryngoscopy, esophagoscopy, excision of maxillary aveolar and buccal lesion, repair with skin graft on 9/17/2020.  Pt with reoccurrence on oral lesion.

## 2020-09-16 ENCOUNTER — TRANSCRIPTION ENCOUNTER (OUTPATIENT)
Age: 35
End: 2020-09-16

## 2020-09-16 NOTE — ASU PATIENT PROFILE, ADULT - PSH
H/O rectocele repair  12/2019  H/O right knee surgery  ACL reconstruction 7/25/16  History of oral lesions  2016 & 2019  History of oral surgery  wisdom teeth removed 1997  S/P tonsillectomy  1990

## 2020-09-17 ENCOUNTER — APPOINTMENT (OUTPATIENT)
Dept: OTOLARYNGOLOGY | Facility: HOSPITAL | Age: 35
End: 2020-09-17

## 2020-09-17 ENCOUNTER — OUTPATIENT (OUTPATIENT)
Dept: OUTPATIENT SERVICES | Facility: HOSPITAL | Age: 35
LOS: 1 days | Discharge: HOME | End: 2020-09-17
Payer: COMMERCIAL

## 2020-09-17 VITALS
TEMPERATURE: 98 F | RESPIRATION RATE: 15 BRPM | OXYGEN SATURATION: 96 % | HEIGHT: 68 IN | SYSTOLIC BLOOD PRESSURE: 122 MMHG | WEIGHT: 231.93 LBS | HEART RATE: 61 BPM | DIASTOLIC BLOOD PRESSURE: 73 MMHG

## 2020-09-17 VITALS — RESPIRATION RATE: 20 BRPM | HEART RATE: 59 BPM | OXYGEN SATURATION: 95 %

## 2020-09-17 DIAGNOSIS — Z87.19 PERSONAL HISTORY OF OTHER DISEASES OF THE DIGESTIVE SYSTEM: Chronic | ICD-10-CM

## 2020-09-17 DIAGNOSIS — Z98.89 OTHER SPECIFIED POSTPROCEDURAL STATES: Chronic | ICD-10-CM

## 2020-09-17 DIAGNOSIS — Z90.89 ACQUIRED ABSENCE OF OTHER ORGANS: Chronic | ICD-10-CM

## 2020-09-17 DIAGNOSIS — Z98.890 OTHER SPECIFIED POSTPROCEDURAL STATES: Chronic | ICD-10-CM

## 2020-09-17 DIAGNOSIS — K13.70 UNSPECIFIED LESIONS OF ORAL MUCOSA: ICD-10-CM

## 2020-09-17 PROCEDURE — 41827 EXCISION OF GUM LESION: CPT | Mod: GC

## 2020-09-17 PROCEDURE — 31525 DX LARYNGOSCOPY EXCL NB: CPT | Mod: GC,59

## 2020-09-17 PROCEDURE — 43191 ESOPHAGOSCOPY RIGID TRNSO DX: CPT | Mod: GC

## 2020-09-17 PROCEDURE — 40816 EXCISION OF MOUTH LESION: CPT | Mod: GC

## 2020-09-17 PROCEDURE — 15275 SKIN SUB GRAFT FACE/NK/HF/G: CPT | Mod: GC

## 2020-09-17 PROCEDURE — 88305 TISSUE EXAM BY PATHOLOGIST: CPT

## 2020-09-17 RX ORDER — FLUOXETINE HCL 10 MG
30 CAPSULE ORAL
Qty: 0 | Refills: 0 | DISCHARGE

## 2020-09-17 RX ORDER — ACETAMINOPHEN 500 MG
650 TABLET ORAL ONCE
Refills: 0 | Status: DISCONTINUED | OUTPATIENT
Start: 2020-09-17 | End: 2020-09-30

## 2020-09-17 RX ORDER — IBUPROFEN 200 MG
2 TABLET ORAL
Qty: 0 | Refills: 0 | DISCHARGE

## 2020-09-17 RX ORDER — CHLORHEXIDINE GLUCONATE 213 G/1000ML
15 SOLUTION TOPICAL
Qty: 420 | Refills: 0
Start: 2020-09-17 | End: 2020-09-30

## 2020-09-17 RX ORDER — IBUPROFEN 200 MG
400 TABLET ORAL ONCE
Refills: 0 | Status: DISCONTINUED | OUTPATIENT
Start: 2020-09-17 | End: 2020-09-30

## 2020-09-17 RX ORDER — ACETAMINOPHEN 500 MG
20.31 TABLET ORAL
Qty: 0 | Refills: 0 | DISCHARGE
Start: 2020-09-17

## 2020-09-17 RX ORDER — OXYCODONE AND ACETAMINOPHEN 5; 325 MG/1; MG/1
1 TABLET ORAL ONCE
Refills: 0 | Status: DISCONTINUED | OUTPATIENT
Start: 2020-09-17 | End: 2020-09-17

## 2020-09-17 RX ORDER — CHLORHEXIDINE GLUCONATE 213 G/1000ML
15 SOLUTION TOPICAL
Qty: 840 | Refills: 0
Start: 2020-09-17 | End: 2020-09-30

## 2020-09-17 RX ORDER — ONDANSETRON 8 MG/1
4 TABLET, FILM COATED ORAL ONCE
Refills: 0 | Status: DISCONTINUED | OUTPATIENT
Start: 2020-09-17 | End: 2020-10-01

## 2020-09-17 RX ORDER — FENTANYL CITRATE 50 UG/ML
25 INJECTION INTRAVENOUS
Refills: 0 | Status: DISCONTINUED | OUTPATIENT
Start: 2020-09-17 | End: 2020-09-17

## 2020-09-17 RX ADMIN — FENTANYL CITRATE 25 MICROGRAM(S): 50 INJECTION INTRAVENOUS at 15:15

## 2020-09-17 NOTE — ASU DISCHARGE PLAN (ADULT/PEDIATRIC) - CALL YOUR DOCTOR IF YOU HAVE ANY OF THE FOLLOWING:
Inability to tolerate liquids or foods Inability to tolerate liquids or foods/Pain not relieved by Medications/Fever greater than (need to indicate Fahrenheit or Celsius)/Wound/Surgical Site with redness, or foul smelling discharge or pus Pain not relieved by Medications/Inability to tolerate liquids or foods/Increased irritability or sluggishness/Fever greater than (need to indicate Fahrenheit or Celsius)/Bleeding that does not stop/Nausea and vomiting that does not stop/Wound/Surgical Site with redness, or foul smelling discharge or pus

## 2020-09-17 NOTE — ASU DISCHARGE PLAN (ADULT/PEDIATRIC) - NURSING INSTRUCTIONS
DO NOT take any Tylenol (Acetaminophen) or narcotics containing Tylenol until after 7:00 p.m. tonight. You received Tylenol during your operation and it can cause damage to your liver if too much is taken within a 24 hour time period.

## 2020-09-17 NOTE — ASU DISCHARGE PLAN (ADULT/PEDIATRIC) - ASU DC SPECIAL INSTRUCTIONSFT
-Tylenol or Motrin as needed for pain  -Peridex mouth wash, swish and spit after meals and before bed as instructed  -Avoid brushing area of lesion when brushing your teeth   -Follow-up with Dr. Garcia in two weeks. Call [phone] to confirm appointment

## 2020-09-17 NOTE — ASU DISCHARGE PLAN (ADULT/PEDIATRIC) - ASU DC SPECIAL INSTRUCTIONSFT
-Tylenol or Motrin as needed for pain  -Peridex mouth wash, swish and spit after meals and before bed as instructed  -Avoid brushing area of lesion when brushing your teeth   -Follow-up with Dr. Garcia in two weeks. Call [phone] to confirm appointment

## 2020-09-17 NOTE — ASU DISCHARGE PLAN (ADULT/PEDIATRIC) - SPECIFY DIET AND FLUID
-Liquids only for 5 days. Than advance to soft diet until your follow-up with Dr. Garcia. -Liquids only for 5 days. Then advance to soft diet until your follow-up with Dr. Garcia.

## 2020-09-17 NOTE — ASU DISCHARGE PLAN (ADULT/PEDIATRIC) - CARE PROVIDER_API CALL
Jose Dev  OTOLARYNGOLOGY  [address] [place] [address]  Phone: [phone]  Fax: [phone]  Follow Up Time:

## 2020-09-17 NOTE — ASU DISCHARGE PLAN (ADULT/PEDIATRIC) - CARE PROVIDER_API CALL
Jose Dev  OTOLARYNGOLOGY  [address] [place] [address]  Phone: [phone]  Fax: [phone]  Follow Up Time:

## 2020-09-18 ENCOUNTER — RESULT REVIEW (OUTPATIENT)
Age: 35
End: 2020-09-18

## 2020-09-19 PROBLEM — E66.9 OBESITY, UNSPECIFIED: Chronic | Status: ACTIVE | Noted: 2020-09-15

## 2020-10-02 ENCOUNTER — APPOINTMENT (OUTPATIENT)
Dept: OTOLARYNGOLOGY | Facility: CLINIC | Age: 35
End: 2020-10-02
Payer: COMMERCIAL

## 2020-10-02 PROCEDURE — 99024 POSTOP FOLLOW-UP VISIT: CPT

## 2020-10-02 NOTE — PHYSICAL EXAM
[Laryngoscopy Performed] : laryngoscopy was performed, see procedure section for findings [FreeTextEntry1] : Lichenoid changes along the L. mandibular alveolus.  The area of recent excision is healing well.   [Normal] : no rashes

## 2020-10-02 NOTE — HISTORY OF PRESENT ILLNESS
[de-identified] : Hx of L buccal dysplasia. She returned recently with the L buccal area is feeling worse. She felt pain when eating hard foods.  She is now s/p reexcision and repair with Alloderm.  She still has some discomfort.  Denies bleeding, dsypnea, dysphagia, otalgia.

## 2020-11-03 ENCOUNTER — APPOINTMENT (OUTPATIENT)
Dept: OTOLARYNGOLOGY | Facility: CLINIC | Age: 35
End: 2020-11-03
Payer: COMMERCIAL

## 2020-11-03 PROCEDURE — 99072 ADDL SUPL MATRL&STAF TM PHE: CPT

## 2020-11-03 PROCEDURE — 99214 OFFICE O/P EST MOD 30 MIN: CPT | Mod: 24

## 2020-11-03 RX ORDER — OXYCODONE AND ACETAMINOPHEN 5; 325 MG/1; MG/1
5-325 TABLET ORAL
Qty: 20 | Refills: 0 | Status: COMPLETED | COMMUNITY
Start: 2020-09-17

## 2020-11-03 NOTE — PHYSICAL EXAM
[Normal] : no rashes [FreeTextEntry1] : Lichenoid changes along the L. mandibular alveolus.  The area of recent excision is healing well.  There is a superficial healing ulcer along the R. oral tongue anteriorly.  The area of ulceration along the R. posterior tongue that she had noticed appears healed.

## 2020-11-03 NOTE — HISTORY OF PRESENT ILLNESS
[de-identified] : Hx of L buccal dysplasia.  She is now s/p reexcision and repair with Alloderm and healing well. no c.o right base of the tongue with ulcer with pain for 6 weeks on and off, no bleeding, no wt loss.  Denies  dyspnea, dysphagia, otalgia.  \par quit smoking for 2-3 yrs

## 2020-11-10 ENCOUNTER — APPOINTMENT (OUTPATIENT)
Dept: DERMATOLOGY | Facility: CLINIC | Age: 35
End: 2020-11-10

## 2021-04-12 ENCOUNTER — APPOINTMENT (OUTPATIENT)
Dept: OTOLARYNGOLOGY | Facility: CLINIC | Age: 36
End: 2021-04-12
Payer: COMMERCIAL

## 2021-04-12 VITALS — TEMPERATURE: 97.2 F | SYSTOLIC BLOOD PRESSURE: 122 MMHG | DIASTOLIC BLOOD PRESSURE: 77 MMHG

## 2021-04-12 PROCEDURE — 99214 OFFICE O/P EST MOD 30 MIN: CPT

## 2021-04-12 PROCEDURE — 99072 ADDL SUPL MATRL&STAF TM PHE: CPT

## 2021-04-12 NOTE — PHYSICAL EXAM
[Normal] : no rashes [FreeTextEntry1] : Lichenoid changes along the L. mandibular alveolus.  The area of recent excision is well healed but with new superficial and flat leukoplakic changes.

## 2021-04-12 NOTE — HISTORY OF PRESENT ILLNESS
[de-identified] : Hx of L buccal dysplasia.  She is now s/p reexcision and repair with Alloderm and healing well. pt c.o tongue pain and very sensitive at the site of surgery, with mild bleeding when brush teeth. pt saw Dr. Noe and given prednisone and hydroxychloroquine with improved.  Denies  dyspnea, dysphagia, otalgia.   wt stable\par quit smoking for 2-3 yrs

## 2021-10-11 ENCOUNTER — APPOINTMENT (OUTPATIENT)
Dept: OTOLARYNGOLOGY | Facility: CLINIC | Age: 36
End: 2021-10-11
Payer: COMMERCIAL

## 2021-10-11 PROCEDURE — 99072 ADDL SUPL MATRL&STAF TM PHE: CPT

## 2021-10-11 PROCEDURE — 99214 OFFICE O/P EST MOD 30 MIN: CPT

## 2021-11-02 NOTE — HISTORY OF PRESENT ILLNESS
[de-identified] : Hx of L buccal dysplasia and s/p reexcision and repair with Alloderm and healing well.  pt c.o tongue pain and very sensitive at the site of surgery, burn and tender at the site when  brush teeth. pt saw Dr. Noe one month ago and continue with hydroxychloroquine rinse.  Denies  dyspnea, dysphagia, otalgia.   wt stable\par quit smoking for 2-3 yrs

## 2021-11-02 NOTE — PHYSICAL EXAM
[FreeTextEntry1] : Lichenoid changes along the L. mandibular alveolus.  The area of recent excision is well healed but with new superficial and flat leukoplakic changes.   [Normal] : no rashes

## 2022-01-24 ENCOUNTER — APPOINTMENT (OUTPATIENT)
Dept: OTOLARYNGOLOGY | Facility: CLINIC | Age: 37
End: 2022-01-24
Payer: COMMERCIAL

## 2022-01-24 VITALS
WEIGHT: 215 LBS | HEART RATE: 68 BPM | HEIGHT: 67 IN | SYSTOLIC BLOOD PRESSURE: 113 MMHG | DIASTOLIC BLOOD PRESSURE: 76 MMHG | BODY MASS INDEX: 33.74 KG/M2 | OXYGEN SATURATION: 99 %

## 2022-01-24 PROCEDURE — 99072 ADDL SUPL MATRL&STAF TM PHE: CPT

## 2022-01-24 PROCEDURE — 99214 OFFICE O/P EST MOD 30 MIN: CPT

## 2022-01-24 RX ORDER — DOXYCYCLINE 100 MG/1
100 TABLET, FILM COATED ORAL
Qty: 28 | Refills: 0 | Status: COMPLETED | COMMUNITY
Start: 2020-10-26 | End: 2022-01-24

## 2022-01-24 RX ORDER — PREDNISONE 10 MG/1
10 TABLET ORAL
Qty: 30 | Refills: 0 | Status: DISCONTINUED | COMMUNITY
Start: 2021-03-18 | End: 2022-01-24

## 2022-01-24 NOTE — PHYSICAL EXAM
[Normal] : no rashes [FreeTextEntry1] : Lichenoid changes along the L. mandibular alveolus.  The area of recent excision is well healed but with stable superficial and flat leukoplakic changes.

## 2022-01-24 NOTE — HISTORY OF PRESENT ILLNESS
[de-identified] : Hx of L buccal dysplasia and s/p reexcision and repair with Alloderm and healing well.  pt states more pain when brush teeth and more sensitive and mild bleeding around the mouth.  pt is also f/u Dr. Noe and last visit 10/2021 and on  hydroxychloroquine rinse.  Denies  dyspnea, dysphagia, otalgia. wt stable\par quit smoking for 2-3 yrs

## 2022-02-14 NOTE — ASU PREOP CHECKLIST - AS BP NONINV METHOD
oriented to person, place, and time. Cranial Nerves: No cranial nerve deficit. Psychiatric:         Behavior: Behavior normal.         Thought Content:  Thought content normal.         Judgment: Judgment normal.         PHQ Scores 2/14/2022 2/14/2022 11/10/2021 8/20/2021 6/7/2021 4/12/2021 2/22/2021   PHQ2 Score 4 2 2 0 0 0 0   PHQ9 Score 12 2 2 0 0 0 0     Interpretation of Total Score Depression Severity: 1-4 = Minimal depression, 5-9 = Mild depression, 10-14 = Moderate depression, 15-19 = Moderately severe depression, 20-27 = Severe depression    MD Anneliese Mcarthur electronic

## 2022-07-19 NOTE — H&P PST ADULT - NS MD HP PULSE RADIAL
I think counseling isn't a bad idea. But also if she does have an acute illness, 1 day won't really be enough to tell if working. Jordyn Phan with counseling referral if she is up for it, otherwise Id recommend close follow up, with me or Dr. Bethany Dobbins next week.     Fabián Garrett MD left normal/right normal

## 2022-07-25 ENCOUNTER — APPOINTMENT (OUTPATIENT)
Dept: OTOLARYNGOLOGY | Facility: CLINIC | Age: 37
End: 2022-07-25

## 2022-07-25 VITALS
HEART RATE: 57 BPM | DIASTOLIC BLOOD PRESSURE: 81 MMHG | WEIGHT: 220 LBS | SYSTOLIC BLOOD PRESSURE: 126 MMHG | HEIGHT: 67 IN | OXYGEN SATURATION: 98 % | BODY MASS INDEX: 34.53 KG/M2

## 2022-07-25 PROCEDURE — 99213 OFFICE O/P EST LOW 20 MIN: CPT

## 2022-07-25 RX ORDER — HYDROXYCHLOROQUINE SULFATE 200 MG/1
200 TABLET, FILM COATED ORAL
Qty: 60 | Refills: 0 | Status: COMPLETED | COMMUNITY
Start: 2021-04-09 | End: 2022-07-25

## 2022-07-25 RX ORDER — FLUOXETINE HYDROCHLORIDE 10 MG/1
10 CAPSULE ORAL
Qty: 30 | Refills: 0 | Status: COMPLETED | COMMUNITY
Start: 2020-09-03 | End: 2022-07-25

## 2022-07-25 RX ORDER — FLUOXETINE HYDROCHLORIDE 20 MG/1
20 CAPSULE ORAL
Qty: 30 | Refills: 0 | Status: COMPLETED | COMMUNITY
Start: 2020-09-03 | End: 2022-07-25

## 2022-07-25 NOTE — PHYSICAL EXAM
[Normal] : no rashes [FreeTextEntry1] : Lichenoid changes along the L. mandibular alveolus.  The area of last excision is well healed but with stable superficial and flat leukoplakic changes involving the alveolar mucosa along the left upper molars.

## 2022-07-25 NOTE — HISTORY OF PRESENT ILLNESS
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   pt states more pain when brush teeth and more sensitive and mild bleeding around the mouth.  pt is also f/u Dr. Noe and last visit was in March 2022.

## 2023-01-20 ENCOUNTER — APPOINTMENT (OUTPATIENT)
Dept: UROGYNECOLOGY | Facility: CLINIC | Age: 38
End: 2023-01-20
Payer: COMMERCIAL

## 2023-01-20 PROCEDURE — 99205 OFFICE O/P NEW HI 60 MIN: CPT

## 2023-01-20 NOTE — DISCUSSION/SUMMARY
[FreeTextEntry1] : I had a very long conversation with Leah re: vELP, dyspareunia, PFD and vulvar burning. I reviewed the pathologies, possible etiologies, diagnosis, symptoms and treatment options available. Written information was given to the patient.\par \par Hydrocortisone 25mg suppositories PV qhs x 4 weeks then decrease to 2x/week.\par \par Clobetasol 0.05% ointment pea-size amt after bath or shower to affected areas BID x 2 weeks then QD until f/u appointment. Amount and location of steroid application was demonstrated to patient today in office via mirror demonstration. \par \par E2/L5% in aloe gel ftp to introiuts qd. Amount and location of cream application was demonstrated to patient today in office via mirror demonstration. I explained that the cream does not work overnight and she needs to continue it for a minimum of 6-8 weeks before we re-evaluate efficacy.\par \par Fluconazole 150mg 1 tab 2x/week for 4 weeks (while on hydrocortisone) then decrease to 1 tab weekly.\par \par Vulvar health techniques rev'd in detail:\par -Warm baths x15-20 mins QD with  Aveeno oatmeal\par -Unscented soaps, body washes, bath gels\par -No fabric softeners or dryer sheets on underwear\par -Unscented pads, tampons (NOT ALWAYS)\par -Ice to vulva\par -Hypoallergenic lubricants\par -White Crisco as needed\par \par We discussed PFD, but jointly decided to get her vLP under control before treating PFD. At this time she can not tolerate internal PFPT. \par \par PFM relaxation techniques rev'd in detail:\par -Warm baths x15-20 mins QD with 2 cups Epsom salt or Aveeno oatmeal\par -No pushing, straining\par -Avoid constipation\par       *Flax seed oil capsules; 2-3 capsules 2-3x/day (titrate as tolerated)\par       *Miralax\par       *Increase water intake\par -No Kegels, no squeezing\par \par RTO 6 weeks\par \par

## 2023-01-20 NOTE — HISTORY OF PRESENT ILLNESS
[FreeTextEntry1] : Leah is a pleasant 36yo F who is here for ELP and dyspareunia. \par \par She was first dx with vELP in . 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 No difference in either vELP or oLP while on meds. \lillie Was also given doxycycline without improvement.\par She had oral lesions excised , , .\par Was given clindamycin/hydrocortisone supp for DIV qhs x 1 year.\par \par She enodorses entry and deep dyspareunia.\par Reports introitus is tight and she fissures with just tip of penis insertion.\par Tampons painful with insertion and removal. \par No pain with tight clothes or prolonged sitting.\par \par  x2 (last delivery )\par Rectocele repair \par Urinary symptoms include +urgency, occasional JEN, no frequency, no hematuria, no RUTIs.\par Had UDS wnl\par Has mild cystocele\par \par Hx RBV, RVVC.\par OCP use x 5years in 20s, then IUD, depo\par Paps wnl, hx HPV  (s/p colposcopies, cervial biopsies, LEEP)\par Menarche age 10, regular q month\par \par When she is in the sun, she gets blistering on her skin.\par Rheumatology r/o lupus, RA

## 2023-01-23 ENCOUNTER — APPOINTMENT (OUTPATIENT)
Dept: OTOLARYNGOLOGY | Facility: CLINIC | Age: 38
End: 2023-01-23
Payer: COMMERCIAL

## 2023-01-23 VITALS
SYSTOLIC BLOOD PRESSURE: 114 MMHG | RESPIRATION RATE: 16 BRPM | TEMPERATURE: 97.3 F | BODY MASS INDEX: 34.53 KG/M2 | WEIGHT: 220 LBS | DIASTOLIC BLOOD PRESSURE: 75 MMHG | HEIGHT: 67 IN | OXYGEN SATURATION: 97 % | HEART RATE: 72 BPM

## 2023-01-23 PROCEDURE — 99213 OFFICE O/P EST LOW 20 MIN: CPT

## 2023-01-23 NOTE — PHYSICAL EXAM
[Normal] : no rashes [FreeTextEntry1] : Lichenoid changes along the L. mandibular alveolus.  The area of last excision is well healed but with stable superficial and flat leukoplakic changes involving the alveolar mucosa along the left upper molars.  There are stable lichenoid changes along the right buccal mucosa as well.

## 2023-01-23 NOTE — HISTORY OF PRESENT ILLNESS
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   pt states more pain when brush teeth and more sensitive and mild bleeding around the mouth.  pt is also f/u Dr. Noe and last visit was in March 2022.

## 2023-02-13 ENCOUNTER — APPOINTMENT (OUTPATIENT)
Dept: OTOLARYNGOLOGY | Facility: CLINIC | Age: 38
End: 2023-02-13
Payer: COMMERCIAL

## 2023-02-13 PROCEDURE — 99213 OFFICE O/P EST LOW 20 MIN: CPT

## 2023-03-03 ENCOUNTER — APPOINTMENT (OUTPATIENT)
Dept: UROGYNECOLOGY | Facility: CLINIC | Age: 38
End: 2023-03-03
Payer: COMMERCIAL

## 2023-03-03 VITALS
SYSTOLIC BLOOD PRESSURE: 114 MMHG | DIASTOLIC BLOOD PRESSURE: 75 MMHG | TEMPERATURE: 97.8 F | HEIGHT: 67 IN | BODY MASS INDEX: 33.74 KG/M2 | WEIGHT: 215 LBS

## 2023-03-03 PROCEDURE — 99214 OFFICE O/P EST MOD 30 MIN: CPT

## 2023-03-03 RX ORDER — TACROLIMUS 1 MG/G
0.1 OINTMENT TOPICAL
Qty: 1 | Refills: 0 | Status: ACTIVE | COMMUNITY
Start: 2023-03-03 | End: 1900-01-01

## 2023-03-03 RX ORDER — CLOBETASOL PROPIONATE 0.5 MG/G
0.05 OINTMENT TOPICAL
Qty: 1 | Refills: 0 | Status: COMPLETED | COMMUNITY
Start: 2023-01-20 | End: 2023-03-03

## 2023-03-03 NOTE — DISCUSSION/SUMMARY
[FreeTextEntry1] : Continue hydrocortisone 25mg suppositories PV qd x 2 more weeks then decrease to 2x/week. \par \par d/c clobetasol\par \par Tacrolimus 0.1% ointment pea-size amt to areas of ELP QD. May apply pea-size amt to tip of suppository prior to insertion.\par \par Continue fluconazole 150mg weekly.\par \par Encouraged Leah to start E2/L5% in aloe ftp to introitus qhs. \par \par Referred to SHAYLEE Prince DNP for Rheumatology consult for medication consult and w/u.\par \par RTO 2 months

## 2023-03-03 NOTE — HISTORY OF PRESENT ILLNESS
[FreeTextEntry1] : Leah is here for a f/u visit.\par \par Had oLP flare was put on po medrol dose pack. She did not use hydrocortisone supp while on medrol. \lillie Has 2 weeks of hydrocortisone supp qd left and will then decrease to 2x/week. \par She has been using topical clobetasol QD. \par She did not start E2/L5% in aloe to introitus. \par She is taking fluconazole 150mg po weekly for VVC ppx. \par \par She is still having severe vulvovaginal burning. \par Has not attempted intercourse. \par \par She is inquiring about po immunosuppressants to treat both oLP and vLP. \par She is having some joint and leg pain. \par She states she gets itchy blisters on her hands and chest after sun exposure. \par Her fingernails have "peeled off".

## 2023-03-03 NOTE — PHYSICAL EXAM
[No Acute Distress] : in no acute distress [Well developed] : well developed [Well Nourished] : ~L well nourished [Good Hygeine] : demonstrates good hygeine [Oriented x3] : oriented to person, place, and time [Normal Memory] : ~T memory was ~L unimpaired [Normal Mood/Affect] : mood and affect are normal [No Edema] : ~T edema was not present [Normal Gait] : gait was normal [Normal] : was normal [Discharge] : a  ~M vaginal discharge was present [Scant] : scant [White] : white [Thin] : thin [No Bleeding] : there was no active vaginal bleeding [Tenderness] : ~T no ~M abdominal tenderness observed [Distended] : not distended [Foul Smelling] : not foul smelling [de-identified] : q-tip touch test 5/10 @ 1,5,6,7,11. +erythema at posterior fourchette. Small fissure at posterior fourchette @ 6 o'clock, erosions, ulcerations. [de-identified] : Derm changes c/w vLP. Sukhwinder's striae improved compared to previous exam, decreased number and size of erythematous, patchy areas medial to Ramos's line and anterior vagina. Improved compared to previous exam [FreeTextEntry4] : Derm changes c/w vLP; PFMs 2-3/4 with MTrPs b/l levators, all groups and transverse perineii; moderate pain to palpatoin

## 2023-03-15 ENCOUNTER — APPOINTMENT (OUTPATIENT)
Dept: RHEUMATOLOGY | Facility: CLINIC | Age: 38
End: 2023-03-15
Payer: COMMERCIAL

## 2023-03-15 ENCOUNTER — NON-APPOINTMENT (OUTPATIENT)
Age: 38
End: 2023-03-15

## 2023-03-15 VITALS
DIASTOLIC BLOOD PRESSURE: 70 MMHG | SYSTOLIC BLOOD PRESSURE: 100 MMHG | OXYGEN SATURATION: 99 % | HEART RATE: 68 BPM | TEMPERATURE: 98.2 F | HEIGHT: 67 IN | WEIGHT: 216 LBS | BODY MASS INDEX: 33.9 KG/M2

## 2023-03-15 DIAGNOSIS — F32.A DEPRESSION, UNSPECIFIED: ICD-10-CM

## 2023-03-15 DIAGNOSIS — Z78.9 OTHER SPECIFIED HEALTH STATUS: ICD-10-CM

## 2023-03-15 DIAGNOSIS — R21 RASH AND OTHER NONSPECIFIC SKIN ERUPTION: ICD-10-CM

## 2023-03-15 PROCEDURE — 99214 OFFICE O/P EST MOD 30 MIN: CPT

## 2023-03-15 PROCEDURE — 99204 OFFICE O/P NEW MOD 45 MIN: CPT

## 2023-03-15 RX ORDER — METHYLPREDNISOLONE 4 MG/1
4 TABLET ORAL
Qty: 1 | Refills: 0 | Status: DISCONTINUED | COMMUNITY
Start: 2023-02-13 | End: 2023-03-15

## 2023-03-15 RX ORDER — CITALOPRAM HYDROBROMIDE 20 MG/1
20 TABLET, FILM COATED ORAL
Qty: 180 | Refills: 1 | Status: ACTIVE | COMMUNITY
Start: 2022-06-29

## 2023-03-15 NOTE — CONSULT LETTER
[Dear  ___] : Dear  [unfilled], [Consult Letter:] : I had the pleasure of evaluating your patient, [unfilled]. [Consult Closing:] : Thank you very much for allowing me to participate in the care of this patient.  If you have any questions, please do not hesitate to contact me. [Sincerely,] : Sincerely, [FreeTextEntry2] : Hannah Hatch NP  [FreeTextEntry1] : Please see below for summary of  recent rheumatology evaluation and recommendations.\par \par 37 y/o f w/ a hx of AITD (nl TFTs- no tx) , chronic recurrent sinusitis, murmur since childhood, depression x many ys w/ post partum, and years of chronic vaginal/ oral lesions- erosive dx w/ lichen planus with severe dyspareunia, raynauds mild, and chronic LBP/ pelvic pain \par Full rheum eval + HLAB51, ? imaging + sacroilitis, otherwise negative \par She is presenting in office as an urgent visit for systemic treatment options for her oral and vaginal lichen planus after failing several topical therapies and HCQ.\par \par 1) Lichen Planus:  erosive oral/ vaginal. + response to steroids but when off immediately returns.  Topical therapies and HCQ/ doxy of limited benefit.  Frustrated with ongoing issues and chronic dyspareunia.  Also hx of tendinopathy recurrently 1 /both knees and inflammatory back pain while denying inflammatory eye/ or bowel disease.  Follows with Dr Noe/ and Hannah Eaton\par Willing to consider systemic tx. \par Hx complicated by description of vesicular, photosensitive rash - sounds like pemphigus and has + FH along with genetic testing + for possible Behcet's.  \par Either way.. systemic therapy is warranted given impact on function/ QOL... \par Will continue to review previous w/u, get bx if rash comes back immediately and consider possible steroid sparing agents.  \par Will start with MTX at this point.. note- does have hx drinking several days/ wk so will start at 10 mg /wk and follow. \par Could also consider 100 mg AZA (concern for possible LFTs- struggles with wt for many ys and ETOH).. and lower doses unlikely to be effective; MMF 1 -2 gm... \par But if becomes more clear for either pemphigus/ or Behcet's may also consider drugs like RTX, apremalist. \par Family Planning:   has had vasectomy/ and not sexually active with intercourse at this time \par NOTE: \par - presented to rheumatologist in the past and full work up came back neg for lupus, sjogrens, and rheumatoid factor (does not recall EDE)....positive HLA-B51 for Behcets \par - HCQ x 12 m no improvement, well tolerated\par - DOxy x 3 m no improvement and worsening photosensitivity (intolerable)\par \par 2) Chronic LBP, pelvic pain and depression x many ys:  fairly controlled on current regimen..\par + inflammatory back symptoms and BL recurrent tendinopathy knees \par \par \par Plan\par \par - given Depo 80 mg IM and then take 5-10 mg prednisone daily as directed \par \par - Start MTX (methotrexate) at 4 tabs (10 mg)  po wk taken all together at the same time.  \par Most people tolerate best at night before bed and in middle of wk\par Do labs in 2 wks, if ok will increase MTX to 6 tabs (15 mg) \par Will then need to do labs every month for the first 6 months.  \par - let us know if you have any problems (nausea, hair loss, mouth sores).  Ideally these will be minimized by....also taking \par \par - Folic acid 1-3 mg daily on all the days you don't take MTX.  Start at 1 mg and increase in having any SE. \par \par - Call if you develop any infections (you may need to hold MTX based on need for antibiotics)\par \par - please get me copies of records to include path, imaging.. looking for SI joint evaluation. \par \par - TEB visit 4-6 wks \par \par  [FreeTextEntry3] : Halima Prince DNP, ANP-C\par Division or Rheumatology\par [facility]\par \par  [DrEmani  ___] : Dr. DOUGLAS

## 2023-03-15 NOTE — REVIEW OF SYSTEMS
[As Noted in HPI] : as noted in HPI [Depression] : depression [Negative] : Heme/Lymph [Skin Lesions] : skin lesion [FreeTextEntry2] : up/ and down.. always struggled with wt  [FreeTextEntry3] : denies inflammatory eye  [FreeTextEntry4] : dx oral lichen planus and oral ulcers [FreeTextEntry7] : denies any GI issues including elevated liver enzymes [FreeTextEntry8] : vaginal lichen planus; dyspareunia x many years  [FreeTextEntry9] : inflammatory back pain noted  [de-identified] : not active now but describes as vesicular .. red/ photosensitive

## 2023-03-15 NOTE — PHYSICAL EXAM
[General Appearance - Alert] : alert [General Appearance - In No Acute Distress] : in no acute distress [General Appearance - Well Nourished] : well nourished [General Appearance - Well Developed] : well developed [Sclera] : the sclera and conjunctiva were normal [PERRL With Normal Accommodation] : pupils were equal in size, round, and reactive to light [Extraocular Movements] : extraocular movements were intact [Outer Ear] : the ears and nose were normal in appearance [Oropharynx] : the oropharynx was normal [Neck Appearance] : the appearance of the neck was normal [Neck Cervical Mass (___cm)] : no neck mass was observed [Jugular Venous Distention Increased] : there was no jugular-venous distention [Thyroid Diffuse Enlargement] : the thyroid was not enlarged [Thyroid Nodule] : there were no palpable thyroid nodules [Respiration, Rhythm And Depth] : normal respiratory rhythm and effort [Auscultation Breath Sounds / Voice Sounds] : lungs were clear to auscultation bilaterally [Heart Rate And Rhythm] : heart rate was normal and rhythm regular [Heart Sounds] : normal S1 and S2 [Heart Sounds Gallop] : no gallops [Murmurs] : no murmurs [Heart Sounds Pericardial Friction Rub] : no pericardial rub [Bowel Sounds] : normal bowel sounds [Abdomen Soft] : soft [Abdomen Tenderness] : non-tender [Abdomen Mass (___ Cm)] : no abdominal mass palpated [Cervical Lymph Nodes Enlarged Posterior Bilaterally] : posterior cervical [Cervical Lymph Nodes Enlarged Anterior Bilaterally] : anterior cervical [Supraclavicular Lymph Nodes Enlarged Bilaterally] : supraclavicular [No CVA Tenderness] : no ~M costovertebral angle tenderness [No Spinal Tenderness] : no spinal tenderness [Abnormal Walk] : normal gait [Nail Clubbing] : no clubbing  or cyanosis of the fingernails [Musculoskeletal - Swelling] : no joint swelling seen [Skin Color & Pigmentation] : normal skin color and pigmentation [Motor Tone] : muscle strength and tone were normal [Skin Turgor] : normal skin turgor [Sensation] : the sensory exam was normal to light touch and pinprick [No Focal Deficits] : no focal deficits [Oriented To Time, Place, And Person] : oriented to person, place, and time [Impaired Insight] : insight and judgment were intact [Affect] : the affect was normal [] : the neck was supple [Edema] : there was no peripheral edema [FreeTextEntry1] : well informed, good historian, reports depression under control

## 2023-03-15 NOTE — HISTORY OF PRESENT ILLNESS
[FreeTextEntry1] : Initial HPI 3/15/2023\par \par 39 y/o f w/ a hx of thyroid disease, chronic sinusitis, heart murmur, depression, dyspareunia, raynauds, erosive lichen planus of vagina and oral lesions (oral dysplasia). She is presenting in office as an urgent visit for systemic treatment options for her oral and vaginal lichen planus after failing several topical therapies and HCQ. \par She reports that the onset of her symptoms are always attributed to a pregnancy. She has been battling w/ chronic intermittent episodes of vaginal lichen planus since having her daughter in  and oral lichen planus since having her son in .\par \par ORAL AND VAGINAL LICHEN PLANUS\par - c/o vaginal lesions, burning, and tearing \par - has tried topical treatments vaginally that were not effective long term\par - pelvic floor exercises were also ineffective (for chronic pelvic pain because it caused additional vaginal tearing)\par - c/o painful sex and has refrained from the activity for a little over 3 years \par \par - has had 3 oral debridement procedures (, , and ).......oral lesions return shortly afterwards\par - currently have some active sores on the side of her R tongue and upper gums, but also gets lesions throughout oral cavity and vaginal \par - presented to Dr. Noe who had her on Plaquenil (dosage unknown) for over a year but did not notice much of a difference.....also tried doxycycline x 3 m and it was not tolerated (caused increased sun sensitivity) and didn't help\par - f/u w/ Dr. Noe every 6 months....he questioned if she had a hx of pemphigus (given + FH and suggestive systemic rash).  Has severe sensitivity to the sun....gets red vascular rash on her chest, shoulders, and hands that is very itchy and often burst releasing fluid \par - rash persists for months unless she is on steroids and always responds.. \par \par - steroids resolve all her issues, but they return soon after she d/c\par - experiences a flares in combination w/each other when she is sick...during COVID she had an oral and vaginal flare at the same time\par \par OTHER RHEUM INFO\par - presented to rheumatologist in the past and full work up came back neg for lupus, sjogrens, and rheumatoid factor (does not recall EDE)....positive HLA-B51 for Behcets \par - most recent labs 2 weeks ago was all nl : CRP, CBC, CMP, hormones (did not do an ESR)\par + inflammatory back symptoms and BL recurrent tendinopathy knees \par \par JOINTS \par - has a hx of recurrent  bursitis in bl knees.....has not been active x many years\par - has chronic back pain (herniated discs and DJD throughout her back)......+ for sacroiliitis (XR reportedly confirm- need to review)..has undergone a variety of treatment options none of which were effective (steroid injection. 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 alcohol use (6 shots 3 times a week) currently trying to cut back.......denies drug use \par \par FAMILY HX \par - thyroid disease (mother and father) and father with pemphigus \par - pemphigoid (father)\par \par

## 2023-03-15 NOTE — ASSESSMENT
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 imaging + sacroilitis, otherwise negative \par She is presenting in office as an urgent visit for systemic treatment options for her oral and vaginal lichen planus after failing several topical therapies and HCQ.\par \par 1) Lichen Planus:  erosive oral/ vaginal. + response to steroids but when off immediately returns.  Topical therapies and HCQ/ doxy of limited benefit.  Frustrated with ongoing issues and chronic dyspareunia.  Also hx of tendinopathy recurrently 1 /both knees and inflammatory back pain while denying inflammatory eye/ or bowel disease.  Follows with Dr Noe/ and Hannah Eaton\par Willing to consider systemic tx. \par Hx complicated by description of vesicular, photosensitive rash - sounds like pemphigus and has + FH along with genetic testing + for possible Behcet's.  \par Either way.. systemic therapy is warranted given impact on function/ QOL... \par Will continue to review previous w/u, get bx if rash comes back immediately and consider possible steroid sparing agents.  \par Will start with MTX at this point.. note- does have hx drinking several days/ wk so will start at 10 mg /wk and follow. \par Could also consider 100 mg AZA (concern for possible LFTs- struggles with wt for many ys and ETOH).. and lower doses unlikely to be effective; MMF 1 -2 gm... \par But if becomes more clear for either pemphigus/ or Behcet's may also consider drugs like RTX, apremalist. \par Family Planning:   has had vasectomy/ and not sexually active with intercourse at this time \par NOTE: \par - presented to rheumatologist in the past and full work up came back neg for lupus, sjogrens, and rheumatoid factor (does not recall EDE)....positive HLA-B51 for Behcets \par - HCQ x 12 m no improvement, well tolerated\par - DOxy x 3 m no improvement and worsening photosensitivity (intolerable)\par \par 2) Chronic LBP, pelvic pain and depression x many ys:  fairly controlled on current regimen..\par + inflammatory back symptoms and BL recurrent tendinopathy knees \par \par \par Plan\par \par - given Depo 80 mg IM and then take 5-10 mg prednisone daily as directed \par \par - Start MTX (methotrexate) at 4 tabs (10 mg)  po wk taken all together at the same time.  \par Most people tolerate best at night before bed and in middle of wk\par Do labs in 2 wks, if ok will increase MTX to 6 tabs (15 mg) \par Will then need to do labs every month for the first 6 months.  \par - let us know if you have any problems (nausea, hair loss, mouth sores).  Ideally these will be minimized by....also taking \par \par - Folic acid 1-3 mg daily on all the days you don't take MTX.  Start at 1 mg and increase in having any SE. \par \par - Call if you develop any infections (you may need to hold MTX based on need for antibiotics)\par \par - please get me copies of records to include path, imaging.. looking for SI joint evaluation. \par \par - TEB visit 4-6 wks \par \par

## 2023-03-20 ENCOUNTER — TRANSCRIPTION ENCOUNTER (OUTPATIENT)
Age: 38
End: 2023-03-20

## 2023-03-21 ENCOUNTER — TRANSCRIPTION ENCOUNTER (OUTPATIENT)
Age: 38
End: 2023-03-21

## 2023-03-27 ENCOUNTER — TRANSCRIPTION ENCOUNTER (OUTPATIENT)
Age: 38
End: 2023-03-27

## 2023-03-29 NOTE — HISTORY OF PRESENT ILLNESS
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  pt has not f/u Dr. Noe for a while and last visit was in March 2022. Pt c.o sore on the right side  tongue for 3 weeks and some what better but still painful, and sensitive,  no bleeding, except gum bleeding.  \par former smoker

## 2023-04-13 ENCOUNTER — TRANSCRIPTION ENCOUNTER (OUTPATIENT)
Age: 38
End: 2023-04-13

## 2023-04-13 LAB
ALBUMIN SERPL ELPH-MCNC: 4 G/DL
ALP BLD-CCNC: 53 U/L
ALT SERPL-CCNC: 19 U/L
ANION GAP SERPL CALC-SCNC: 13 MMOL/L
AST SERPL-CCNC: 23 U/L
BASOPHILS # BLD AUTO: 0.04 K/UL
BASOPHILS NFR BLD AUTO: 0.6 %
BILIRUB SERPL-MCNC: 0.4 MG/DL
BUN SERPL-MCNC: 11 MG/DL
CALCIUM SERPL-MCNC: 9.3 MG/DL
CHLORIDE SERPL-SCNC: 104 MMOL/L
CO2 SERPL-SCNC: 25 MMOL/L
CREAT SERPL-MCNC: 0.71 MG/DL
CRP SERPL-MCNC: <3 MG/L
EGFR: 112 ML/MIN/1.73M2
EOSINOPHIL # BLD AUTO: 0.13 K/UL
EOSINOPHIL NFR BLD AUTO: 2 %
GLUCOSE SERPL-MCNC: 74 MG/DL
HCT VFR BLD CALC: 40 %
HGB BLD-MCNC: 13 G/DL
IMM GRANULOCYTES NFR BLD AUTO: 0.3 %
LYMPHOCYTES # BLD AUTO: 2.61 K/UL
LYMPHOCYTES NFR BLD AUTO: 39.5 %
MAN DIFF?: NORMAL
MCHC RBC-ENTMCNC: 29.5 PG
MCHC RBC-ENTMCNC: 32.5 GM/DL
MCV RBC AUTO: 90.7 FL
MONOCYTES # BLD AUTO: 0.52 K/UL
MONOCYTES NFR BLD AUTO: 7.9 %
NEUTROPHILS # BLD AUTO: 3.29 K/UL
NEUTROPHILS NFR BLD AUTO: 49.7 %
PLATELET # BLD AUTO: 263 K/UL
POTASSIUM SERPL-SCNC: 4.1 MMOL/L
PROT SERPL-MCNC: 6.1 G/DL
RBC # BLD: 4.41 M/UL
RBC # FLD: 14.4 %
SODIUM SERPL-SCNC: 142 MMOL/L
WBC # FLD AUTO: 6.61 K/UL

## 2023-04-28 ENCOUNTER — APPOINTMENT (OUTPATIENT)
Dept: RHEUMATOLOGY | Facility: CLINIC | Age: 38
End: 2023-04-28
Payer: COMMERCIAL

## 2023-04-28 DIAGNOSIS — M06.4 INFLAMMATORY POLYARTHROPATHY: ICD-10-CM

## 2023-04-28 PROCEDURE — 99213 OFFICE O/P EST LOW 20 MIN: CPT | Mod: 95

## 2023-04-30 NOTE — ASSESSMENT
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 imaging + sacroilitis, otherwise negative \par She is presenting in office as an urgent visit for systemic treatment options for her oral and vaginal lichen planus after failing several topical therapies and HCQ.\par \par 1) Lichen Planus w/ + HLAB51 (bx not supportive):  erosive oral/ vaginal. + response to steroids but when off immediately returns.  Topical therapies and HCQ/ doxy of limited benefit.  Frustrated with ongoing issues and chronic dyspareunia.  Also hx of tendinopathy recurrently 1 /both knees and inflammatory back pain while denying inflammatory eye/ or bowel disease.  Follows with Dr Noe/ and Hannah Eaton\par Hx complicated by description of vesicular, photosensitive rash - sounds like pemphigus and has + FH along with genetic testing + for possible Behcet's (but path otherwise not suggestive of either).. .  \par Either way.. systemic therapy is warranted given impact on function/ QOL... \par - Has started MTX at 10 mg only fairly controlled, following improvement with steroids (poorly tolerated).  Will increase MTX but unlikely to tolerate PO dosing.. will switch to SQ.. Pt is unable to withdraw, maneuver and self inject generic MTX vials and syringe and will use Rasuvo/ or OTrexup.  .\par Will continue to review previous w/u, get bx if rash comes back immediately and consider possible steroid sparing agents:   \par Will continue with MTX at this point.\par Could also consider 100 mg AZA (concern for possible LFTs- struggles with wt for many ys and ETOH).. and lower doses unlikely to be effective; MMF 1 -2 gm... \par But if becomes more clear for either pemphigus/ or Behcet's may also consider drugs like RTX, apremalist. \par Family Planning:   has had vasectomy/ and not sexually active with intercourse at this time \par NOTE: \par - presented to rheumatologist in the past and full work up came back neg for lupus, sjogrens, and rheumatoid factor (does not recall EDE)....positive HLA-B51 for Behcets \par - HCQ x 12 m no improvement, well tolerated\par - DOxy x 3 m no improvement and worsening photosensitivity (intolerable)\par \par 2) Chronic LBP, pelvic pain and depression x many ys:  fairly controlled on current regimen..\par + inflammatory back symptoms and BL recurrent tendinopathy knees \par \par 3) Seronegative RA;  joints as noted above, need to adjust MTX as described above.. \par \par \par Plan\par \par - continue with MTX but increase to 25 mg (if tolerated) slowly increasing dose every 2 wks by 5 mg if tolerated, may divide dose for better tolerance \par \par - will also try to get Rasuvo approved..  \par \par - Continue Folic acid 1-3 mg daily on all the days you don't take MTX.  Start at 1 mg and increase in having any SE. \par \par - Call if you develop any infections (you may need to hold MTX based on need for antibiotics)\par \par - please get me copies of records to include path, imaging.. looking for SI joint evaluation (will order imaging if not done)\par \par - TEB visit 4-6 wks again.. \par \par

## 2023-04-30 NOTE — REVIEW OF SYSTEMS
[Skin Lesions] : skin lesion [As Noted in HPI] : as noted in HPI [Depression] : depression [Negative] : Heme/Lymph [FreeTextEntry2] : up/ and down.. always struggled with wt  [FreeTextEntry3] : denies inflammatory eye  [FreeTextEntry4] : dx oral lichen planus and oral ulcers [FreeTextEntry8] : vaginal lichen planus; dyspareunia x many years  [FreeTextEntry7] : denies any GI issues including elevated liver enzymes [FreeTextEntry9] : inflammatory back pain noted  [de-identified] : not active now but describes as vesicular .. red/ photosensitive

## 2023-04-30 NOTE — HISTORY OF PRESENT ILLNESS
[Home] : at home, [unfilled] , at the time of the visit. [Medical Office: ([facility])___] : at the medical office located in  [Verbal consent obtained from patient] : the patient, [unfilled] [FreeTextEntry1] : Verbal consent given on 2023 at 16:04 by AMANDA CONLEY, [].\par \par  did note improvement with steroids.. but hates use of steroids, bloating  and thus far NO improvement with MTX at low dose 10 mg.  Mild GI distress noted even at this low dose and increase in ulcerations in mouth- hard to know if these are exacerbation of underlying condition or SE from MTX\par - taking FA 2 mg daily \par - Labs 23 all nl and no evidence of MTX toxicity\par \par 1) Lichen planus:  oral/ vaginal, severe for several ys.  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 She is presenting in office as an urgent visit for systemic treatment options for her oral and vaginal lichen planus after failing several topical therapies and HCQ. \par She reports that the onset of her symptoms are always attributed to a pregnancy. She has been battling w/ chronic intermittent episodes of vaginal lichen planus since having her daughter in  and oral lichen planus since having her son in .\par \par ORAL AND VAGINAL LICHEN PLANUS\par - c/o vaginal lesions, burning, and tearing \par - has tried topical treatments vaginally that were not effective long term\par - pelvic floor exercises were also ineffective (for chronic pelvic pain because it caused additional vaginal tearing)\par - c/o painful sex and has refrained from the activity for a little over 3 years \par \par - has had 3 oral debridement procedures (, , and ).......oral lesions return shortly afterwards\par - currently have some active sores on the side of her R tongue and upper gums, but also gets lesions throughout oral cavity and vaginal \par - presented to Dr. Noe who had her on Plaquenil (dosage unknown) for over a year but did not notice much of a difference.....also tried doxycycline x 3 m and it was not tolerated (caused increased sun sensitivity) and didn't help\par - f/u w/ Dr. Noe every 6 months....he questioned if she had a hx of pemphigus (given + FH and suggestive systemic rash).  Has severe sensitivity to the sun....gets red vascular rash on her chest, shoulders, and hands that is very itchy and often burst releasing fluid \par - rash persists for months unless she is on steroids and always responds.. \par \par - steroids resolve all her issues, but they return soon after she d/c\par - experiences a flares in combination w/each other when she is sick...during COVID she had an oral and vaginal flare at the same time\par \par OTHER RHEUM INFO\par - presented to rheumatologist in the past and full work up came back neg for lupus, sjogrens, and rheumatoid factor (does not recall EDE)....positive HLA-B51 for Behcets \par - most recent labs 2 weeks ago was all nl : CRP, CBC, CMP, hormones (did not do an ESR)\par + inflammatory back symptoms and BL recurrent tendinopathy knees \par \par JOINTS \par - has a hx of recurrent  bursitis in bl knees.....has not been active x many years\par - has chronic back pain (herniated discs and DJD throughout her back)......+ for sacroiliitis (XR reportedly confirm- need to review)..has undergone a variety of treatment options none of which were effective (steroid injection. 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 alcohol use (6 shots 3 times a week) currently trying to cut back.......denies drug use \par \par FAMILY HX \par - thyroid disease (mother and father) and father with pemphigus \par - pemphigoid (father)\par \par

## 2023-04-30 NOTE — PHYSICAL EXAM
[General Appearance - Alert] : alert [General Appearance - In No Acute Distress] : in no acute distress [General Appearance - Well Nourished] : well nourished [General Appearance - Well Developed] : well developed [Oropharynx] : the oropharynx was normal [] : the neck was supple [No Focal Deficits] : no focal deficits [Oriented To Time, Place, And Person] : oriented to person, place, and time [Impaired Insight] : insight and judgment were intact [Affect] : the affect was normal [Outer Ear] : the ears and nose were normal in appearance [FreeTextEntry1] : well informed, good historian, reports depression under control but VERY frustrated with protracted sx

## 2023-05-18 ENCOUNTER — APPOINTMENT (OUTPATIENT)
Dept: RHEUMATOLOGY | Facility: CLINIC | Age: 38
End: 2023-05-18
Payer: COMMERCIAL

## 2023-05-18 PROCEDURE — 99442: CPT

## 2023-05-18 RX ORDER — METHOTREXATE 2.5 MG/1
2.5 TABLET ORAL
Qty: 16 | Refills: 2 | Status: DISCONTINUED | COMMUNITY
Start: 2023-03-20 | End: 2023-05-18

## 2023-05-19 ENCOUNTER — APPOINTMENT (OUTPATIENT)
Dept: UROGYNECOLOGY | Facility: CLINIC | Age: 38
End: 2023-05-19
Payer: COMMERCIAL

## 2023-05-19 VITALS — DIASTOLIC BLOOD PRESSURE: 66 MMHG | TEMPERATURE: 98.5 F | SYSTOLIC BLOOD PRESSURE: 113 MMHG

## 2023-05-19 DIAGNOSIS — N94.89 OTHER SPECIFIED CONDITIONS ASSOCIATED WITH FEMALE GENITAL ORGANS AND MENSTRUAL CYCLE: ICD-10-CM

## 2023-05-19 PROCEDURE — 99214 OFFICE O/P EST MOD 30 MIN: CPT

## 2023-05-19 NOTE — DISCUSSION/SUMMARY
[FreeTextEntry1] : Continue Tacrolimus 0.1% ointment pea-size amt to areas of ELP QD. \par Continue E2/L5% in aloe ftp to introitus hs 3x/week. \par \par Fluconazole 150mg po Q3d x dsoes to self-start with VVC symptoms. Will resume weekly ppx if she has RVVC.\par \par I encouraged Leah to start dilating at home approx 3x/week for 10-15 mins (containment). May put thin coat of tacrolimus on tip of dilator).\par \par Suggested [employer] to vulvovaginal area prior to swimming in chlorinated pools.\par \par f/u Dr. Prince\par \par RTO 3 months or sooner if needed

## 2023-05-19 NOTE — HISTORY OF PRESENT ILLNESS
[FreeTextEntry1] : Leah is here for a f/u visit.\par \par She is using tacrolimus topically 3x/week.\par She is using topcial E2/Lido 5% in aloe gel approx 3x/week to introitus.\par She has not been taking fluconazole 150mg po weekly for VVC ppx. She takes it occasionally, approx 1-2x/month.\par \par Her vulvovaginal burning has improved.\par Has not attempted intercourse. \par She is now able to insert a tampon.\par She is not dilating.\par \par She saw SHAYLEE Prince DNP for systemic treatment.\par She is now on MTX 25mg SQ weekly.\par Is on prednisone 20mg po qd to decrease to 10mg qd for maintenance.\par No longer using hydrocortisone suppositories.\par \par She is still having severe oLP flares.\par \par

## 2023-05-19 NOTE — PHYSICAL EXAM
[No Acute Distress] : in no acute distress [Well developed] : well developed [Well Nourished] : ~L well nourished [Good Hygeine] : demonstrates good hygeine [Oriented x3] : oriented to person, place, and time [Normal Memory] : ~T memory was ~L unimpaired [Normal Mood/Affect] : mood and affect are normal [No Edema] : ~T edema was not present [Normal Gait] : gait was normal [Normal] : was normal [No Bleeding] : there was no active vaginal bleeding [Tenderness] : ~T no ~M abdominal tenderness observed [Distended] : not distended [de-identified] : q-tip touch test 4/10 @ 5,6,7. +erythema at posterior fourchette. Small fissure at posterior fourchette @ 5 o'clock, no erosions, no ulcerations. [de-identified] : Derm changes c/w vLP. Sukhiwnder's striae improved compared to previous exam, decreased number and size of erythematous, patchy areas medial to Ramos's line and anterior vagina. Improved compared to previous exam. [FreeTextEntry4] : Derm changes c/w vLP

## 2023-05-20 LAB
AA PROT SER-MCNC: 1.3 UG/ML
ALBUMIN MFR SERPL ELPH: 59.9 %
ALBUMIN SERPL ELPH-MCNC: 4.1 G/DL
ALBUMIN SERPL-MCNC: 3.8 G/DL
ALBUMIN/GLOB SERPL: 1.5 RATIO
ALP BLD-CCNC: 60 U/L
ALPHA1 GLOB MFR SERPL ELPH: 4.7 %
ALPHA1 GLOB SERPL ELPH-MCNC: 0.3 G/DL
ALPHA2 GLOB MFR SERPL ELPH: 9.3 %
ALPHA2 GLOB SERPL ELPH-MCNC: 0.6 G/DL
ALT SERPL-CCNC: 19 U/L
ANA SER IF-ACNC: NEGATIVE
ANION GAP SERPL CALC-SCNC: 11 MMOL/L
AST SERPL-CCNC: 22 U/L
B-GLOBULIN MFR SERPL ELPH: 12.7 %
B-GLOBULIN SERPL ELPH-MCNC: 0.8 G/DL
BASOPHILS # BLD AUTO: 0.03 K/UL
BASOPHILS NFR BLD AUTO: 0.5 %
BILIRUB SERPL-MCNC: 0.2 MG/DL
BIOMARKER COMMENT: NORMAL
BUN SERPL-MCNC: 11 MG/DL
C3 SERPL-MCNC: 135 MG/DL
C4 SERPL-MCNC: 29 MG/DL
CALCIUM SERPL-MCNC: 9.3 MG/DL
CCP AB SER IA-ACNC: <8 UNITS
CHLORIDE SERPL-SCNC: 104 MMOL/L
CO2 SERPL-SCNC: 24 MMOL/L
CREAT SERPL-MCNC: 0.73 MG/DL
CRP SERPL-MCNC: 4 MG/L
CRP SERPL-MCNC: 4.6 MG/L
DSDNA AB SER-ACNC: <12 IU/ML
EGF SERPL-MCNC: 73 PG/ML
EGFR: 108 ML/MIN/1.73M2
ENA RNP AB SER IA-ACNC: <0.2 AL
ENA SM AB SER IA-ACNC: <0.2 AL
ENA SS-A AB SER IA-ACNC: <0.2 AL
ENA SS-B AB SER IA-ACNC: <0.2 AL
EOSINOPHIL # BLD AUTO: 0.11 K/UL
EOSINOPHIL NFR BLD AUTO: 1.9 %
ERYTHROCYTE [SEDIMENTATION RATE] IN BLOOD BY WESTERGREN METHOD: 15 MM/HR
FOOTNOTE: NORMAL
GAMMA GLOB FLD ELPH-MCNC: 0.9 G/DL
GAMMA GLOB MFR SERPL ELPH: 13.4 %
GLUCOSE SERPL-MCNC: 94 MG/DL
HCT VFR BLD CALC: 40.8 %
HGB BLD-MCNC: 13.6 G/DL
IL6 SERPL-MCNC: 5.2 PG/ML
IMM GRANULOCYTES NFR BLD AUTO: 0.3 %
INTERPRETATION SERPL IEP-IMP: NORMAL
LEPTIN SERPL-MCNC: 53 NG/ML
LYMPHOCYTES # BLD AUTO: 2.05 K/UL
LYMPHOCYTES NFR BLD AUTO: 34.5 %
Lab: NORMAL
Lab: NORMAL
MAN DIFF?: NORMAL
MCHC RBC-ENTMCNC: 30.2 PG
MCHC RBC-ENTMCNC: 33.3 GM/DL
MCV RBC AUTO: 90.5 FL
MMP-1 SERPL-MCNC: 7.3 NG/ML
MMP-3 SERPL-MCNC: 16 NG/ML
MONOCYTES # BLD AUTO: 0.44 K/UL
MONOCYTES NFR BLD AUTO: 7.4 %
NEUTROPHILS # BLD AUTO: 3.29 K/UL
NEUTROPHILS NFR BLD AUTO: 55.4 %
PLATELET # BLD AUTO: 285 K/UL
POTASSIUM SERPL-SCNC: 4.2 MMOL/L
PROT SERPL-MCNC: 6.4 G/DL
PROT SERPL-MCNC: 6.4 G/DL
PROT SERPL-MCNC: 6.5 G/DL
RA DAS LEVEL QL VECTRADA: NORMAL
RBC # BLD: 4.51 M/UL
RBC # FLD: 14.7 %
RESISTIN SERPL-MCNC: 3.8 NG/ML
RF+CCP IGG SER-IMP: NEGATIVE
RHEUMATOID FACT SER QL: <10 IU/ML
RISK OF RADIOGRAPHIC PROGRESS: 4 %
SODIUM SERPL-SCNC: 139 MMOL/L
TNFRSF1A SERPL-MCNC: 0.78 NG/ML
VAP-1 SERPL-MCNC: 0.64 UG/ML
VECTRA SCORE: 31
VEGF-A SERPL-MCNC: 270 PG/ML
WBC # FLD AUTO: 5.94 K/UL
YKL-40 RESULT: 23 NG/ML

## 2023-06-06 ENCOUNTER — APPOINTMENT (OUTPATIENT)
Dept: RHEUMATOLOGY | Facility: CLINIC | Age: 38
End: 2023-06-06
Payer: COMMERCIAL

## 2023-06-06 PROCEDURE — 99212 OFFICE O/P EST SF 10 MIN: CPT | Mod: 95

## 2023-06-09 LAB
ALBUMIN SERPL ELPH-MCNC: 4.1 G/DL
ALP BLD-CCNC: 55 U/L
ALT SERPL-CCNC: 20 U/L
ANION GAP SERPL CALC-SCNC: 17 MMOL/L
AST SERPL-CCNC: 21 U/L
BILIRUB SERPL-MCNC: 0.4 MG/DL
BUN SERPL-MCNC: 13 MG/DL
CALCIUM SERPL-MCNC: 9.3 MG/DL
CHLORIDE SERPL-SCNC: 106 MMOL/L
CO2 SERPL-SCNC: 21 MMOL/L
CREAT SERPL-MCNC: 0.74 MG/DL
CRP SERPL-MCNC: <3 MG/L
EGFR: 106 ML/MIN/1.73M2
GLUCOSE SERPL-MCNC: 82 MG/DL
POTASSIUM SERPL-SCNC: 4.2 MMOL/L
PROT SERPL-MCNC: 6.3 G/DL
SODIUM SERPL-SCNC: 144 MMOL/L

## 2023-06-12 ENCOUNTER — TRANSCRIPTION ENCOUNTER (OUTPATIENT)
Age: 38
End: 2023-06-12

## 2023-06-16 ENCOUNTER — TRANSCRIPTION ENCOUNTER (OUTPATIENT)
Age: 38
End: 2023-06-16

## 2023-06-19 ENCOUNTER — NON-APPOINTMENT (OUTPATIENT)
Age: 38
End: 2023-06-19

## 2023-06-20 NOTE — REVIEW OF SYSTEMS
[Skin Lesions] : skin lesion [As Noted in HPI] : as noted in HPI [Depression] : depression [Negative] : Heme/Lymph [FreeTextEntry2] : up/ and down.. always struggled with wt  [FreeTextEntry3] : denies inflammatory eye  [FreeTextEntry4] : dx oral lichen planus and oral ulcers [FreeTextEntry7] : denies any GI issues including elevated liver enzymes [FreeTextEntry8] : vaginal lichen planus; dyspareunia x many years  [FreeTextEntry9] : inflammatory back pain noted  [de-identified] : not active now but describes as vesicular .. red/ photosensitive

## 2023-06-20 NOTE — PHYSICAL EXAM
[General Appearance - Alert] : alert [General Appearance - In No Acute Distress] : in no acute distress [General Appearance - Well Nourished] : well nourished [General Appearance - Well Developed] : well developed [Outer Ear] : the ears and nose were normal in appearance [Oropharynx] : the oropharynx was normal [] : the neck was supple [No Focal Deficits] : no focal deficits [Oriented To Time, Place, And Person] : oriented to person, place, and time [Impaired Insight] : insight and judgment were intact [Affect] : the affect was normal [FreeTextEntry1] : well informed, good historian, reports depression under control but VERY frustrated with protracted sx

## 2023-06-20 NOTE — HISTORY OF PRESENT ILLNESS
[Home] : at home, [unfilled] , at the time of the visit. [Medical Office: ([facility])___] : at the medical office located in  [Verbal consent obtained from patient] : the patient, [unfilled] [FreeTextEntry1] : Verbal consent given on 2023 at 19:05 by AMANDA CONLEY, [].\par [place]\par \par  did note improvement with steroids.. but hates use of steroids, bloating  and thus far NO improvement with MTX at low dose 10 mg.  Mild GI distress noted even at this low dose and increase in ulcerations in mouth- hard to know if these are exacerbation of underlying condition or SE from MTX\par - taking FA 2 mg daily \par - Labs 23 all nl and no evidence of MTX toxicity\par \par 1) Lichen planus:  oral/ vaginal, severe for several ys.  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 She is presenting in office as an urgent visit for systemic treatment options for her oral and vaginal lichen planus after failing several topical therapies and HCQ. \par She reports that the onset of her symptoms are always attributed to a pregnancy. She has been battling w/ chronic intermittent episodes of vaginal lichen planus since having her daughter in  and oral lichen planus since having her son in .\par \par ORAL AND VAGINAL LICHEN PLANUS\par - c/o vaginal lesions, burning, and tearing \par - has tried topical treatments vaginally that were not effective long term\par - pelvic floor exercises were also ineffective (for chronic pelvic pain because it caused additional vaginal tearing)\par - c/o painful sex and has refrained from the activity for a little over 3 years \par \par - has had 3 oral debridement procedures (, , and ).......oral lesions return shortly afterwards\par - currently have some active sores on the side of her R tongue and upper gums, but also gets lesions throughout oral cavity and vaginal \par - presented to Dr. Noe who had her on Plaquenil (dosage unknown) for over a year but did not notice much of a difference.....also tried doxycycline x 3 m and it was not tolerated (caused increased sun sensitivity) and didn't help\par - f/u w/ Dr. Noe every 6 months....he questioned if she had a hx of pemphigus (given + FH and suggestive systemic rash).  Has severe sensitivity to the sun....gets red vascular rash on her chest, shoulders, and hands that is very itchy and often burst releasing fluid \par - rash persists for months unless she is on steroids and always responds.. \par \par - steroids resolve all her issues, but they return soon after she d/c\par - experiences a flares in combination w/each other when she is sick...during COVID she had an oral and vaginal flare at the same time\par \par OTHER RHEUM INFO\par - presented to rheumatologist in the past and full work up came back neg for lupus, sjogrens, and rheumatoid factor (does not recall EDE)....positive HLA-B51 for Behcets \par - most recent labs 2 weeks ago was all nl : CRP, CBC, CMP, hormones (did not do an ESR)\par + inflammatory back symptoms and BL recurrent tendinopathy knees \par \par JOINTS \par - has a hx of recurrent  bursitis in bl knees.....has not been active x many years\par - has chronic back pain (herniated discs and DJD throughout her back)......+ for sacroiliitis (XR reportedly confirm- need to review)..has undergone a variety of treatment options none of which were effective (steroid injection. epidurals, electrical stimulation device)\par \par MOOD\par - has had depression since she was a teenager\par - manageable now despite chronic condition.....was severe post-partum\par - currently on Citalopram 30 mg (high doses caused her to become irate over the smallest things- premenstrual)\par \par SOCIAL HX\par -  w/ 2 kids (M0)....does not plan on getting pregnant again\par - works as a  in a [employer] last at [employer]\par - former/occasional smoker....smoked half a pack a day for 14 years... alcohol use (6 shots 3 times a week) currently trying to cut back.......denies drug use \par \par FAMILY HX \par - thyroid disease (mother and father) and father with pemphigus \par - pemphigoid (father)\par \par

## 2023-06-29 ENCOUNTER — NON-APPOINTMENT (OUTPATIENT)
Age: 38
End: 2023-06-29

## 2023-06-29 ENCOUNTER — TRANSCRIPTION ENCOUNTER (OUTPATIENT)
Age: 38
End: 2023-06-29

## 2023-07-06 ENCOUNTER — TRANSCRIPTION ENCOUNTER (OUTPATIENT)
Age: 38
End: 2023-07-06

## 2023-07-17 LAB
ALBUMIN SERPL ELPH-MCNC: 4.1 G/DL
ALP BLD-CCNC: 60 U/L
ALT SERPL-CCNC: 21 U/L
ANION GAP SERPL CALC-SCNC: 13 MMOL/L
AST SERPL-CCNC: 22 U/L
BILIRUB SERPL-MCNC: 0.3 MG/DL
BUN SERPL-MCNC: 12 MG/DL
CALCIUM SERPL-MCNC: 9.3 MG/DL
CHLORIDE SERPL-SCNC: 104 MMOL/L
CO2 SERPL-SCNC: 26 MMOL/L
CREAT SERPL-MCNC: 0.79 MG/DL
CRP SERPL-MCNC: <3 MG/L
EGFR: 98 ML/MIN/1.73M2
GLUCOSE SERPL-MCNC: 87 MG/DL
POTASSIUM SERPL-SCNC: 4.2 MMOL/L
PROT SERPL-MCNC: 6.3 G/DL
SODIUM SERPL-SCNC: 143 MMOL/L

## 2023-07-24 ENCOUNTER — APPOINTMENT (OUTPATIENT)
Dept: OTOLARYNGOLOGY | Facility: CLINIC | Age: 38
End: 2023-07-24
Payer: COMMERCIAL

## 2023-07-24 VITALS
BODY MASS INDEX: 33.9 KG/M2 | DIASTOLIC BLOOD PRESSURE: 77 MMHG | SYSTOLIC BLOOD PRESSURE: 118 MMHG | HEART RATE: 84 BPM | RESPIRATION RATE: 16 BRPM | OXYGEN SATURATION: 97 % | HEIGHT: 67 IN | WEIGHT: 216 LBS

## 2023-07-24 PROCEDURE — 99213 OFFICE O/P EST LOW 20 MIN: CPT

## 2023-07-24 NOTE — HISTORY OF PRESENT ILLNESS
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 Pt is here for 6 months f/u and has no new c.o, on and off ulcer in the mouth, on with prednisone ( given by Alex). Pt has not f/u Dr. Noe for a while.    \par former smoker.

## 2023-07-27 ENCOUNTER — APPOINTMENT (OUTPATIENT)
Dept: RHEUMATOLOGY | Facility: CLINIC | Age: 38
End: 2023-07-27
Payer: COMMERCIAL

## 2023-07-27 PROCEDURE — 99442: CPT

## 2023-07-27 RX ORDER — LEFLUNOMIDE 10 MG/1
10 TABLET, FILM COATED ORAL DAILY
Qty: 30 | Refills: 2 | Status: DISCONTINUED | COMMUNITY
Start: 2023-06-06 | End: 2023-07-27

## 2023-08-18 ENCOUNTER — TRANSCRIPTION ENCOUNTER (OUTPATIENT)
Age: 38
End: 2023-08-18

## 2023-08-21 ENCOUNTER — TRANSCRIPTION ENCOUNTER (OUTPATIENT)
Age: 38
End: 2023-08-21

## 2023-09-07 ENCOUNTER — APPOINTMENT (OUTPATIENT)
Dept: RHEUMATOLOGY | Facility: CLINIC | Age: 38
End: 2023-09-07
Payer: COMMERCIAL

## 2023-09-07 VITALS
WEIGHT: 205 LBS | BODY MASS INDEX: 32.18 KG/M2 | OXYGEN SATURATION: 98 % | DIASTOLIC BLOOD PRESSURE: 60 MMHG | HEIGHT: 67 IN | SYSTOLIC BLOOD PRESSURE: 110 MMHG | HEART RATE: 77 BPM | TEMPERATURE: 98 F

## 2023-09-07 DIAGNOSIS — T78.40XA ALLERGY, UNSPECIFIED, INITIAL ENCOUNTER: ICD-10-CM

## 2023-09-07 PROCEDURE — 99214 OFFICE O/P EST MOD 30 MIN: CPT

## 2023-09-07 NOTE — HISTORY OF PRESENT ILLNESS
[FreeTextEntry1] : 2023 Active chronic ulcerations/ scarring lesions oral/ vaginal .. some improvement with MTX - added LEF but can't say it made any difference.   REcent trial Otezla with NO benefit and after few wks experienced severe diffuse pruritus worse over LLQ abd and lateral hp.. fully resolved when stopped.  So severe required steroids to control, better now fully  - she has not noticed a difference since being off of leflunomide - did not tolerate otezla...itchiness on LE and stomach...resolved on course of prednisone  - still on MTX full dose and has noticed some hair thinning .. but no apthous ulcerations, cheilosis or other mucosal complications.   - see oral pathologist Dr. Noe who has done surgery x 3...did a biopsy and it came back hyperkeratosis and focal mild epithelial dysplasia and chronic mucositis, last done   1) Lichen planus:  oral/ vaginal, severe for several ys.    ____________________________________________________________________  Initial HPI 3/15/2023  39 y/o f w/ a hx of thyroid disease, chronic sinusitis, heart murmur, depression, dyspareunia, raynauds, erosive lichen planus of vagina and oral lesions (oral dysplasia). She is presenting in office as an urgent visit for systemic treatment options for her oral and vaginal lichen planus after failing several topical therapies and HCQ.  She reports that the onset of her symptoms are always attributed to a pregnancy. She has been battling w/ chronic intermittent episodes of vaginal lichen planus since having her daughter in  and oral lichen planus since having her son in .  ORAL AND VAGINAL LICHEN PLANUS - c/o vaginal lesions, burning, and tearing  - has tried topical treatments vaginally that were not effective long term - pelvic floor exercises were also ineffective (for chronic pelvic pain because it caused additional vaginal tearing) - c/o painful sex and has refrained from the activity for a little over 3 years   - has had 3 oral debridement procedures (, , and ).......oral lesions return shortly afterwards - currently have some active sores on the side of her R tongue and upper gums, but also gets lesions throughout oral cavity and vaginal  - presented to Dr. Noe who had her on Plaquenil (dosage unknown) for over a year but did not notice much of a difference.....also tried doxycycline x 3 m and it was not tolerated (caused increased sun sensitivity) and didn't help - f/u w/ Dr. Noe every 6 months....he questioned if she had a hx of pemphigus (given + FH and suggestive systemic rash).  Has severe sensitivity to the sun....gets red vascular rash on her chest, shoulders, and hands that is very itchy and often burst releasing fluid  - rash persists for months unless she is on steroids and always responds..   - steroids resolve all her issues, but they return soon after she d/c - experiences a flares in combination w/each other when she is sick...during COVID she had an oral and vaginal flare at the same time  OTHER RHEUM INFO - presented to rheumatologist in the past and full work up came back neg for lupus, sjogrens, and rheumatoid factor (does not recall EDE)....positive HLA-B51 for Behcets  - most recent labs 2 weeks ago was all nl : CRP, CBC, CMP, hormones (did not do an ESR) + inflammatory back symptoms and BL recurrent tendinopathy knees   JOINTS  - has a hx of recurrent  bursitis in bl knees.....has not been active x many years - has chronic back pain (herniated discs and DJD throughout her back)......+ for sacroiliitis (XR reportedly confirm- need to review)..has undergone a variety of treatment options none of which were effective (steroid injection. epidurals, electrical stimulation device)  MOOD - has had depression since she was a teenager - manageable now despite chronic condition.....was severe post-partum - currently on Citalopram 30 mg (high doses caused her to become irate over the smallest things- premenstrual)  SOCIAL HX -  w/ 2 kids (M0)....does not plan on getting pregnant again - works as a  in a [employer] last at [employer] - former/occasional smoker....smoked half a pack a day for 14 years... alcohol use (6 shots 3 times a week) currently trying to cut back.......denies drug use   FAMILY HX  - thyroid disease (mother and father) and father with pemphigus  - pemphigoid (father)

## 2023-09-07 NOTE — REVIEW OF SYSTEMS
[Skin Lesions] : skin lesion [Depression] : depression [Negative] : Heme/Lymph [FreeTextEntry2] : up/ and down.. always struggled with wt  [FreeTextEntry3] : denies inflammatory eye  [FreeTextEntry4] : dx oral lichen planus and oral ulcers [FreeTextEntry7] : denies any GI issues including elevated liver enzymes [FreeTextEntry8] : vaginal lichen planus; dyspareunia x many years  [FreeTextEntry9] : inflammatory back pain noted  [de-identified] : not active now but describes as vesicular .. red/ photosensitive

## 2023-09-07 NOTE — PHYSICAL EXAM
[General Appearance - Alert] : alert [General Appearance - In No Acute Distress] : in no acute distress [General Appearance - Well Nourished] : well nourished [General Appearance - Well Developed] : well developed [Outer Ear] : the ears and nose were normal in appearance [Oropharynx] : the oropharynx was normal [No Focal Deficits] : no focal deficits [Oriented To Time, Place, And Person] : oriented to person, place, and time [Impaired Insight] : insight and judgment were intact [Affect] : the affect was normal [] : no respiratory distress [Respiration, Rhythm And Depth] : normal respiratory rhythm and effort [Auscultation Breath Sounds / Voice Sounds] : lungs were clear to auscultation bilaterally [Heart Rate And Rhythm] : heart rate was normal and rhythm regular [Heart Sounds] : normal S1 and S2 [Heart Sounds Gallop] : no gallops [Murmurs] : no murmurs [Heart Sounds Pericardial Friction Rub] : no pericardial rub [No CVA Tenderness] : no ~M costovertebral angle tenderness [No Spinal Tenderness] : no spinal tenderness [FreeTextEntry1] : well informed, good historian, reports depression under control but VERY frustrated with protracted sx

## 2023-09-07 NOTE — ASSESSMENT
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 imaging + sacroilitis, otherwise negative    1) Lichen Planus w/ + HLAB51 (bx not supportive):  erosive oral/ vaginal. + response to steroids but when off immediately returns.  Topical therapies and HCQ/ doxy of limited benefit.  Frustrated with ongoing issues and chronic dyspareunia.  Also hx of tendinopathy recurrently 1 /both knees and inflammatory back pain while denying inflammatory eye/ or bowel disease.  Follows with Dr Noe/ and Hannah Eaton Hx complicated by description of vesicular, photosensitive rash - sounds like pemphigus and has + FH along with genetic testing + for possible Behcet's (but path otherwise not suggestive of either).. .   Either way.. systemic therapy is warranted given impact on function/ QOL...  - Has started MTX at 10 mg only fairly controlled, following improvement with steroids (poorly tolerated).  Will increase MTX but unlikely to tolerate PO dosing.. will switch to SQ.. Pt is unable to withdraw, maneuver and self inject generic MTX vials and syringe and will use Rasuvo/ or OTrexup.  . Will continue to review previous w/u, get bx if rash comes back immediately and consider possible steroid sparing agents:    Will continue with MTX at this point. Could also consider 100 mg AZA (concern for possible LFTs- struggles with wt for many ys and ETOH).. and lower doses unlikely to be effective; MMF 1 -2 gm...  But if becomes more clear for either pemphigus/ or Behcet's may also consider drugs like RTX,.  Family Planning:   has had vasectomy/ and not sexually active with intercourse at this time  NOTE:  - presented to rheumatologist in the past and full work up came back neg for lupus, sjogrens, and rheumatoid factor (does not recall EDE)....positive HLA-B51 for Behcets  - HCQ x 12 m no improvement, well tolerated - Allergic reaction severe pruritus in response to Otezla after few wks  - LEF not improvement after several months - DOxy x 3 m no improvement and worsening photosensitivity (intolerable)  2) Chronic LBP, pelvic pain and depression x many ys:  fairly controlled on current regimen.. + inflammatory back symptoms and BL recurrent tendinopathy knees   3) Seronegative RA;  joints as noted above, need to adjust MTX as described above..    Plan - complete labs monthly   - provided information packet on mycophenolate   - considering starting on mycophenolate....will discuss w/derm colleague first  would start on 500 mg and will continue to increase weekly until at 2 grams will contact you to inform you of what treatment option is next  - continue MTX 25 mg for now   - Continue Folic acid 1-3 mg daily on all the days you don't take MTX.  Start at 1 mg and increase in having any SE.   - Call if you develop any infections (you may need to hold MTX based on need for antibiotics)  - will start MMF at 500 mg once daily increasing each wk by 500 mg till on 2 gm (awaiting review case with Dr Koch.../ and [facility])  - RTO 11/4/2023

## 2023-09-08 ENCOUNTER — APPOINTMENT (OUTPATIENT)
Dept: UROGYNECOLOGY | Facility: CLINIC | Age: 38
End: 2023-09-08
Payer: COMMERCIAL

## 2023-09-08 VITALS
SYSTOLIC BLOOD PRESSURE: 102 MMHG | TEMPERATURE: 97.7 F | HEIGHT: 67 IN | DIASTOLIC BLOOD PRESSURE: 69 MMHG | WEIGHT: 205 LBS | BODY MASS INDEX: 32.18 KG/M2

## 2023-09-08 DIAGNOSIS — M35.2 BEHCET'S DISEASE: ICD-10-CM

## 2023-09-08 DIAGNOSIS — M79.18 MYALGIA, OTHER SITE: ICD-10-CM

## 2023-09-08 DIAGNOSIS — N94.10 UNSPECIFIED DYSPAREUNIA: ICD-10-CM

## 2023-09-08 DIAGNOSIS — R10.2 PELVIC AND PERINEAL PAIN: ICD-10-CM

## 2023-09-08 DIAGNOSIS — N89.8 OTHER SPECIFIED NONINFLAMMATORY DISORDERS OF VAGINA: ICD-10-CM

## 2023-09-08 PROCEDURE — 99215 OFFICE O/P EST HI 40 MIN: CPT

## 2023-09-08 NOTE — HISTORY OF PRESENT ILLNESS
[FreeTextEntry1] : Leah is here for a f/u visit.  She has not used tacrolimus in a couple of months. She is using topical E2/Lido 5% in aloe gel approx 1-2x/week to introitus.  Her vulvovaginal burning has improved. She had intercourse last weekend. +entry dyspareunia (burning). Reports she felt something "pop" with deep thrusting. Has been having spotting ever since.  She is now able to insert a tampon. She is not dilating.  She is seeing SHAYLEE Prince DNP for systemic treatment. She is now on MTX 25mg SQ weekly. She is off prednisone. Tried: fluonimide, Otezla (had allergic reaction). She is planning on trying Cellcept. No longer using hydrocortisone suppositories.  Both eLP and oLP have been under control.

## 2023-09-08 NOTE — PHYSICAL EXAM
[No Acute Distress] : in no acute distress [Well developed] : well developed [Well Nourished] : ~L well nourished [Good Hygeine] : demonstrates good hygeine [Oriented x3] : oriented to person, place, and time [Normal Memory] : ~T memory was ~L unimpaired [Normal Mood/Affect] : mood and affect are normal [No Edema] : ~T edema was not present [Normal Gait] : gait was normal [No Bleeding] : there was no active vaginal bleeding [Discharge] : a  ~M vaginal discharge was present [Moderate] : moderate [Glen] : yellow [Thin] : thin [Blood-Tinged] : blood-tinged [Normal] : no abnormalities [Exam Deferred] : was deferred [Tenderness] : ~T no ~M abdominal tenderness observed [Distended] : not distended [de-identified] : q-tip touch test 4/10 @ 5,6,7. +erythema at posterior fourchette. No fissures, no erosions, no ulcerations. [de-identified] : Derm changes c/w vLP, significantly improved. No evidence of active vLP. (speculum exam done) [FreeTextEntry4] : Derm changes c/w vLP. Small abrasion R anterior vaginal wall +bleeding with q-tip palpation.

## 2023-09-08 NOTE — DISCUSSION/SUMMARY
[FreeTextEntry1] : Continue Tacrolimus 0.1% ointment pea-size amt to areas of ELP QD x 2-3 weeks then decrease to 2-3x/week.  Continue E2/L5% in aloe ftp to introitus qhs.  Leah understands that benzodiazepines are a CS, even in suppository form. She understands the potential for dependence/abuse, drug tolerance, potential for addiction. She understands that this class of drugs is habit-forming and [facility] regulated. The sedative effects of benzos can be potentiated by taking alcohol, sleeping pills, opioids. The decision to drive is the patient's responsibility, as benzos can affect her driving ability and ability to concentrate. The goal is to wean off benzos. The patient verbalized understanding of everything we discussed and would like to continue with medication at this time. ISTOP checked. She also understands that in order to continue to get refills on her diazepam prescription, she needs to be seen in the office at minimum every 3 months.  I asked Leah to apply a thin coat of E2/L5% on tip of supp qhs x 7-10d then decrease to 2x/week.  Lidocaine 7.5% in petrolatum ftp amt to introitus as needed and 5-10 mins prior to intercourse. Instructed Leah to remove excess or have  use condom before P-V penetration.  Referred to [facility]. Rx in [employer].   I encouraged Leah to start dilating at home approx 3x/week for 10-15 mins (containment). May put thin coat of tacrolimus on tip of dilator).  Suggested [employer] to vulvovaginal area prior to swimming in chlorinated pools.  Affirm sent  f/u Dr. Prince  RTO 3 months or sooner if needed

## 2023-09-11 RX ORDER — APREMILAST 30 MG/1
30 TABLET, FILM COATED ORAL
Qty: 180 | Refills: 3 | Status: DISCONTINUED | COMMUNITY
Start: 2023-07-27 | End: 2023-09-11

## 2023-09-12 LAB
CANDIDA VAG CYTO: NOT DETECTED
G VAGINALIS+PREV SP MTYP VAG QL MICRO: NOT DETECTED
T VAGINALIS VAG QL WET PREP: NOT DETECTED

## 2023-09-14 ENCOUNTER — TRANSCRIPTION ENCOUNTER (OUTPATIENT)
Age: 38
End: 2023-09-14

## 2023-09-19 ENCOUNTER — APPOINTMENT (OUTPATIENT)
Dept: DERMATOLOGY | Facility: CLINIC | Age: 38
End: 2023-09-19
Payer: COMMERCIAL

## 2023-09-19 ENCOUNTER — NON-APPOINTMENT (OUTPATIENT)
Age: 38
End: 2023-09-19

## 2023-09-19 DIAGNOSIS — K12.0 RECURRENT ORAL APHTHAE: ICD-10-CM

## 2023-09-19 DIAGNOSIS — N94.819 VULVODYNIA, UNSPECIFIED: ICD-10-CM

## 2023-09-19 PROCEDURE — 99205 OFFICE O/P NEW HI 60 MIN: CPT

## 2023-09-24 ENCOUNTER — TRANSCRIPTION ENCOUNTER (OUTPATIENT)
Age: 38
End: 2023-09-24

## 2023-09-24 LAB
ALBUMIN SERPL ELPH-MCNC: 4.2 G/DL
ALP BLD-CCNC: 73 U/L
ALT SERPL-CCNC: 14 U/L
ANION GAP SERPL CALC-SCNC: 11 MMOL/L
AST SERPL-CCNC: 17 U/L
BILIRUB SERPL-MCNC: 0.2 MG/DL
BUN SERPL-MCNC: 12 MG/DL
CALCIUM SERPL-MCNC: 9 MG/DL
CHLORIDE SERPL-SCNC: 105 MMOL/L
CO2 SERPL-SCNC: 25 MMOL/L
CREAT SERPL-MCNC: 0.77 MG/DL
EGFR: 101 ML/MIN/1.73M2
ERYTHROCYTE [SEDIMENTATION RATE] IN BLOOD BY WESTERGREN METHOD: 6 MM/HR
GLUCOSE SERPL-MCNC: 70 MG/DL
HCT VFR BLD CALC: 40.1 %
HGB BLD-MCNC: 13 G/DL
MCHC RBC-ENTMCNC: 30 PG
MCHC RBC-ENTMCNC: 32.4 GM/DL
MCV RBC AUTO: 92.4 FL
PLATELET # BLD AUTO: 296 K/UL
POTASSIUM SERPL-SCNC: 4.1 MMOL/L
PROT SERPL-MCNC: 6.4 G/DL
RBC # BLD: 4.34 M/UL
RBC # FLD: 14.4 %
SODIUM SERPL-SCNC: 141 MMOL/L
WBC # FLD AUTO: 6.54 K/UL

## 2023-09-25 ENCOUNTER — APPOINTMENT (OUTPATIENT)
Dept: RHEUMATOLOGY | Facility: CLINIC | Age: 38
End: 2023-09-25
Payer: COMMERCIAL

## 2023-09-25 PROCEDURE — 90471 IMMUNIZATION ADMIN: CPT

## 2023-09-25 PROCEDURE — 90677 PCV20 VACCINE IM: CPT

## 2023-09-28 ENCOUNTER — TRANSCRIPTION ENCOUNTER (OUTPATIENT)
Age: 38
End: 2023-09-28

## 2023-10-02 ENCOUNTER — TRANSCRIPTION ENCOUNTER (OUTPATIENT)
Age: 38
End: 2023-10-02

## 2023-10-31 LAB
ALBUMIN SERPL ELPH-MCNC: 4.2 G/DL
ALP BLD-CCNC: 68 U/L
ALT SERPL-CCNC: 17 U/L
ANION GAP SERPL CALC-SCNC: 10 MMOL/L
APPEARANCE: CLEAR
AST SERPL-CCNC: 19 U/L
BILIRUB SERPL-MCNC: 0.4 MG/DL
BILIRUBIN URINE: NEGATIVE
BLOOD URINE: NEGATIVE
BUN SERPL-MCNC: 12 MG/DL
CALCIUM SERPL-MCNC: 9 MG/DL
CHLORIDE SERPL-SCNC: 103 MMOL/L
CO2 SERPL-SCNC: 26 MMOL/L
COLOR: YELLOW
CREAT SERPL-MCNC: 0.72 MG/DL
CRP SERPL-MCNC: <3 MG/L
EGFR: 110 ML/MIN/1.73M2
ERYTHROCYTE [SEDIMENTATION RATE] IN BLOOD BY WESTERGREN METHOD: 13 MM/HR
GLUCOSE QUALITATIVE U: NEGATIVE MG/DL
GLUCOSE SERPL-MCNC: 68 MG/DL
HCT VFR BLD CALC: 40.3 %
HGB BLD-MCNC: 13.4 G/DL
KETONES URINE: NEGATIVE MG/DL
LEUKOCYTE ESTERASE URINE: NEGATIVE
MCHC RBC-ENTMCNC: 29.5 PG
MCHC RBC-ENTMCNC: 33.3 GM/DL
MCV RBC AUTO: 88.8 FL
NITRITE URINE: NEGATIVE
PH URINE: 6.5
PLATELET # BLD AUTO: 309 K/UL
POTASSIUM SERPL-SCNC: 4.5 MMOL/L
PROT SERPL-MCNC: 6.7 G/DL
PROTEIN URINE: NEGATIVE MG/DL
RBC # BLD: 4.54 M/UL
RBC # FLD: 14.3 %
SODIUM SERPL-SCNC: 138 MMOL/L
SPECIFIC GRAVITY URINE: 1.02
UROBILINOGEN URINE: 0.2 MG/DL
WBC # FLD AUTO: 5.45 K/UL

## 2023-11-20 ENCOUNTER — RX RENEWAL (OUTPATIENT)
Age: 38
End: 2023-11-20

## 2023-11-28 ENCOUNTER — MED ADMIN CHARGE (OUTPATIENT)
Age: 38
End: 2023-11-28

## 2023-11-29 ENCOUNTER — APPOINTMENT (OUTPATIENT)
Dept: RHEUMATOLOGY | Facility: CLINIC | Age: 38
End: 2023-11-29
Payer: COMMERCIAL

## 2023-11-29 VITALS
HEIGHT: 67 IN | DIASTOLIC BLOOD PRESSURE: 72 MMHG | SYSTOLIC BLOOD PRESSURE: 98 MMHG | OXYGEN SATURATION: 98 % | HEART RATE: 74 BPM | WEIGHT: 209 LBS | TEMPERATURE: 96.9 F | BODY MASS INDEX: 32.8 KG/M2

## 2023-11-29 DIAGNOSIS — Z79.631 LONG TERM (CURRENT) USE OF ANTIMETABOLITE AGENT: ICD-10-CM

## 2023-11-29 DIAGNOSIS — L43.8 OTHER LICHEN PLANUS: ICD-10-CM

## 2023-11-29 DIAGNOSIS — D84.9 IMMUNODEFICIENCY, UNSPECIFIED: ICD-10-CM

## 2023-11-29 DIAGNOSIS — M06.00 RHEUMATOID ARTHRITIS W/OUT RHEUMATOID FACTOR, UNSPECIFIED SITE: ICD-10-CM

## 2023-11-29 PROCEDURE — 90471 IMMUNIZATION ADMIN: CPT

## 2023-11-29 PROCEDURE — 99214 OFFICE O/P EST MOD 30 MIN: CPT | Mod: 25

## 2023-11-29 PROCEDURE — 90750 HZV VACC RECOMBINANT IM: CPT

## 2023-11-30 LAB
ALBUMIN SERPL ELPH-MCNC: 4.2 G/DL
ALP BLD-CCNC: 60 U/L
ALT SERPL-CCNC: 11 U/L
ANION GAP SERPL CALC-SCNC: 9 MMOL/L
AST SERPL-CCNC: 15 U/L
BILIRUB SERPL-MCNC: 0.3 MG/DL
BUN SERPL-MCNC: 11 MG/DL
CALCIUM SERPL-MCNC: 9.2 MG/DL
CHLORIDE SERPL-SCNC: 105 MMOL/L
CO2 SERPL-SCNC: 26 MMOL/L
CREAT SERPL-MCNC: 0.68 MG/DL
EGFR: 114 ML/MIN/1.73M2
ERYTHROCYTE [SEDIMENTATION RATE] IN BLOOD BY WESTERGREN METHOD: 11 MM/HR
GLUCOSE SERPL-MCNC: 73 MG/DL
HCT VFR BLD CALC: 39.6 %
HGB BLD-MCNC: 12.7 G/DL
MCHC RBC-ENTMCNC: 28.9 PG
MCHC RBC-ENTMCNC: 32.1 GM/DL
MCV RBC AUTO: 90.2 FL
PLATELET # BLD AUTO: 279 K/UL
POTASSIUM SERPL-SCNC: 4.4 MMOL/L
PROT SERPL-MCNC: 6.3 G/DL
RBC # BLD: 4.39 M/UL
RBC # FLD: 14.2 %
SODIUM SERPL-SCNC: 140 MMOL/L
WBC # FLD AUTO: 5.39 K/UL

## 2023-12-06 RX ORDER — PREDNISONE 5 MG/1
5 TABLET ORAL
Qty: 90 | Refills: 1 | Status: DISCONTINUED | COMMUNITY
Start: 2023-03-15 | End: 2023-12-06

## 2023-12-20 ENCOUNTER — RX RENEWAL (OUTPATIENT)
Age: 38
End: 2023-12-20

## 2023-12-27 ENCOUNTER — APPOINTMENT (OUTPATIENT)
Dept: DERMATOLOGY | Facility: CLINIC | Age: 38
End: 2023-12-27

## 2024-01-02 ENCOUNTER — NON-APPOINTMENT (OUTPATIENT)
Age: 39
End: 2024-01-02

## 2024-01-03 ENCOUNTER — APPOINTMENT (OUTPATIENT)
Dept: UROGYNECOLOGY | Facility: CLINIC | Age: 39
End: 2024-01-03

## 2024-01-05 ENCOUNTER — NON-APPOINTMENT (OUTPATIENT)
Age: 39
End: 2024-01-05

## 2024-01-05 ENCOUNTER — APPOINTMENT (OUTPATIENT)
Dept: UROGYNECOLOGY | Facility: CLINIC | Age: 39
End: 2024-01-05

## 2024-01-08 ENCOUNTER — TRANSCRIPTION ENCOUNTER (OUTPATIENT)
Age: 39
End: 2024-01-08

## 2024-01-08 ENCOUNTER — NON-APPOINTMENT (OUTPATIENT)
Age: 39
End: 2024-01-08

## 2024-01-22 ENCOUNTER — APPOINTMENT (OUTPATIENT)
Dept: OTOLARYNGOLOGY | Facility: CLINIC | Age: 39
End: 2024-01-22
Payer: COMMERCIAL

## 2024-01-22 VITALS
SYSTOLIC BLOOD PRESSURE: 113 MMHG | DIASTOLIC BLOOD PRESSURE: 74 MMHG | WEIGHT: 210 LBS | HEART RATE: 80 BPM | BODY MASS INDEX: 32.96 KG/M2 | OXYGEN SATURATION: 97 % | HEIGHT: 67 IN

## 2024-01-22 DIAGNOSIS — K13.70 UNSPECIFIED LESIONS OF ORAL MUCOSA: ICD-10-CM

## 2024-01-22 DIAGNOSIS — K13.79 OTHER LESIONS OF ORAL MUCOSA: ICD-10-CM

## 2024-01-22 DIAGNOSIS — L43.9 LICHEN PLANUS, UNSPECIFIED: ICD-10-CM

## 2024-01-22 PROCEDURE — 99213 OFFICE O/P EST LOW 20 MIN: CPT

## 2024-01-22 RX ORDER — ZOSTER VACCINE RECOMBINANT, ADJUVANTED 50 MCG/0.5
50 KIT INTRAMUSCULAR
Qty: 1 | Refills: 1 | Status: DISCONTINUED | COMMUNITY
Start: 2023-09-24 | End: 2024-01-11

## 2024-01-22 RX ORDER — ZOSTER VACCINE RECOMBINANT, ADJUVANTED 50 MCG/0.5
50 KIT INTRAMUSCULAR
Qty: 1 | Refills: 1 | Status: COMPLETED | COMMUNITY
Start: 2023-09-11 | End: 2024-01-22

## 2024-01-22 RX ORDER — HYDROCORTISONE ACETATE 25 MG/1
25 SUPPOSITORY RECTAL
Qty: 30 | Refills: 3 | Status: COMPLETED | COMMUNITY
Start: 2023-01-20 | End: 2024-01-22

## 2024-01-22 RX ORDER — CLOBETASOL PROPIONATE 0.5 MG/G
0.05 GEL TOPICAL TWICE DAILY
Qty: 1 | Refills: 2 | Status: COMPLETED | COMMUNITY
Start: 2023-06-15 | End: 2024-01-04

## 2024-01-22 RX ORDER — FLUCONAZOLE 150 MG/1
150 TABLET ORAL
Qty: 8 | Refills: 2 | Status: COMPLETED | COMMUNITY
Start: 2023-01-20 | End: 2024-01-04

## 2024-01-22 NOTE — HISTORY OF PRESENT ILLNESS
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  Pt is here for 6 months f/u and has no new c.o, no more ulcer in the mouth. Pt has not f/u Dr. Noe for 2 yrs.      former smoker.

## 2024-01-22 NOTE — PHYSICAL EXAM
[Normal] : no rashes [FreeTextEntry1] : Lichenoid changes along the L. mandibular alveolus.  The area of last excision is well healed but with stable superficial and flat leukoplakic changes involving the alveolar mucosa along the left upper molars.  This area appears improved - less inflammatory changes throughout.  There are stable lichenoid changes along the right buccal mucosa as well.

## 2024-03-22 ENCOUNTER — APPOINTMENT (OUTPATIENT)
Dept: RHEUMATOLOGY | Facility: CLINIC | Age: 39
End: 2024-03-22
Payer: COMMERCIAL

## 2024-03-22 VITALS
OXYGEN SATURATION: 98 % | SYSTOLIC BLOOD PRESSURE: 108 MMHG | TEMPERATURE: 95.3 F | HEIGHT: 67 IN | BODY MASS INDEX: 33.43 KG/M2 | DIASTOLIC BLOOD PRESSURE: 70 MMHG | HEART RATE: 71 BPM | WEIGHT: 213 LBS

## 2024-03-22 PROCEDURE — 99213 OFFICE O/P EST LOW 20 MIN: CPT

## 2024-03-22 RX ORDER — MYCOPHENOLATE MOFETIL 500 MG/1
500 TABLET ORAL
Qty: 180 | Refills: 1 | Status: ACTIVE | COMMUNITY
Start: 2023-09-11 | End: 1900-01-01

## 2024-03-22 RX ORDER — ZOSTER VACCINE RECOMBINANT, ADJUVANTED 50 MCG/0.5
50 KIT INTRAMUSCULAR
Qty: 1 | Refills: 0 | Status: ACTIVE | COMMUNITY
Start: 2024-03-22 | End: 1900-01-01

## 2024-03-22 NOTE — ASSESSMENT
[FreeTextEntry1] : 38 y/o f w/ a hx of AITD (nl TFTs- no tx) , chronic recurrent sinusitis, murmur since childhood, depression x many ys w/ post partum, and years of chronic vaginal/ oral lesions- erosive dx w/ lichen planus with severe dyspareunia, raynauds mild, and chronic LBP/ pelvic pain Full rheum eval + HLAB51, ? imaging + sacroilitis, otherwise negative   1) Lichen Planus w/ + HLAB51 (bx otherwise not supportive of Behcet's): erosive oral/ vaginal. + response to steroids but when off immediately returns. Topical therapies and HCQ/ doxy of limited benefit. Frustrated with ongoing issues and chronic dyspareunia. Also hx of tendinopathy recurrently 1 /both knees and inflammatory back pain (nl SI joints XR 6/23) while denying inflammatory eye/ or bowel disease.  Currently all much better on monotx MTX 15 mg.. has been off MMF for several months following breast sx.. though 60% better would like full remission.. will resume now at 1000 mg daily.   If not achieved in 6 m will consider adding Lexie.. will need to collaborate with team below.  Follows with Dr Noe/ Jose - ENT (oral sx), (needs updated eval)/ and Hannah Eaton Hx complicated by description of vesicular, photosensitive rash - sounds like pemphigus and has + FH along with genetic testing + for possible Behcet's (but path otherwise not suggestive of either).. Had requested updated study but not done.. given how well doing now will not pursue at this time  Either way.. systemic therapy has absolutely been helpful and warranted given  impact on function/ QOL especially appreciated now with some relief and resumption of relatively nl intimacy again... Has remained off steroids for several months now.  Could also consider switching MTx to 100 mg AZA (concern for possible LFTs- struggles with wt for many ys and ETOH- not necessary on lower dose MTX).. and lower doses unlikely to be effective; MMF 1 -2 gm... But if becomes more clear for either pemphigus/ or Behcet's may also consider drugs like RTX,. FAMILY PLANNING:  has had vasectomy... active sexually and is aware of need to avoid pregnancy given MMF/ MTX both.  If reconsidering will need to alter regimen NOTE: - presented to rheumatologist in the past and full work up came back neg for lupus, sjogrens, and rheumatoid factor ....positive HLA-B51 for Behcets - HCQ x 12 m no improvement, well tolerated - Allergic reaction severe pruritus in response to Otezla after few wks - LEF not improvement after several months - Higher doses of MMF and MTX not tolerated GI distress and hair loss  - DOxy x 3 m no improvement and worsening photosensitivity (intolerable)  2) Chronic LBP, pelvic pain and depression x many ys: fairly controlled on current regimen.. + inflammatory back symptoms and BL recurrent tendinopathy knees  3) Seronegative RA; joints as noted in past, nothing active at this time .as noted above    Plan: - complete labs monthly or every 3 months (orders on file)  - Administered shingles vaccine by RN when delivered #2   - referring to oral pathologist Dr. Javi Cisse/ [facility] team- and or continue to work with Dr Garcia (ENT)  - resume MMF 1000 mg daily and continue 15 mg MTX SQ  - Continue Folic acid 1-3 mg daily on all the days you don't take MTX. Start at 1 mg and increase in having any SE.  - Call if you develop any infections (you may need to hold MTX based on need for antibiotics)  - RTO in 6 months

## 2024-03-22 NOTE — HISTORY OF PRESENT ILLNESS
[FreeTextEntry1] : 3/22/24 - noted some improvement in vaginal lesions following breast reduction.. and c/w MTX at 15 mg.. nearly full resolution and has been able to have sex with nearly complete resolution of pain- mild still present but  - did not restart MMF, but on MTX 15 mg (better tolerated than FS-hair growing back in.. ) .. and has not used tacrolimus - oral lesions improving, nothing new and NO ulcerations in more than 6 m   Overall would like to be even better.. willing to resume MMF  - recent evidence suggests + response to Lexie too...    1) Lichen planus:  oral/ vaginal, severe for several ys.    ____________________________________________________________________  Initial HPI 3/15/2023  37 y/o f w/ a hx of thyroid disease, chronic sinusitis, heart murmur, depression, dyspareunia, raynauds, erosive lichen planus of vagina and oral lesions (oral dysplasia). She is presenting in office as an urgent visit for systemic treatment options for her oral and vaginal lichen planus after failing several topical therapies and HCQ.  She reports that the onset of her symptoms are always attributed to a pregnancy. She has been battling w/ chronic intermittent episodes of vaginal lichen planus since having her daughter in  and oral lichen planus since having her son in .  ORAL AND VAGINAL LICHEN PLANUS - c/o vaginal lesions, burning, and tearing  - has tried topical treatments vaginally that were not effective long term - pelvic floor exercises were also ineffective (for chronic pelvic pain because it caused additional vaginal tearing) - c/o painful sex and has refrained from the activity for a little over 3 years   - has had 3 oral debridement procedures (, , and ).......oral lesions return shortly afterwards - currently have some active sores on the side of her R tongue and upper gums, but also gets lesions throughout oral cavity and vaginal  - presented to Dr. Noe who had her on Plaquenil (dosage unknown) for over a year but did not notice much of a difference.....also tried doxycycline x 3 m and it was not tolerated (caused increased sun sensitivity) and didn't help - f/u w/ Dr. Noe every 6 months....he questioned if she had a hx of pemphigus (given + FH and suggestive systemic rash).  Has severe sensitivity to the sun....gets red vascular rash on her chest, shoulders, and hands that is very itchy and often burst releasing fluid  - rash persists for months unless she is on steroids and always responds..   - steroids resolve all her issues, but they return soon after she d/c - experiences a flares in combination w/each other when she is sick...during COVID she had an oral and vaginal flare at the same time  OTHER RHEUM INFO - presented to rheumatologist in the past and full work up came back neg for lupus, sjogrens, and rheumatoid factor (does not recall EDE)....positive HLA-B51 for Behcets  - most recent labs 2 weeks ago was all nl : CRP, CBC, CMP, hormones (did not do an ESR) + inflammatory back symptoms and BL recurrent tendinopathy knees   JOINTS  - has a hx of recurrent  bursitis in bl knees.....has not been active x many years - has chronic back pain (herniated discs and DJD throughout her back)......+ for sacroiliitis (XR reportedly confirm- need to review)..has undergone a variety of treatment options none of which were effective (steroid injection. epidurals, electrical stimulation device)  MOOD - has had depression since she was a teenager - manageable now despite chronic condition.....was severe post-partum - currently on Citalopram 30 mg (high doses caused her to become irate over the smallest things- premenstrual)  SOCIAL HX -  w/ 2 kids (M0)....does not plan on getting pregnant again - works as a  in a [employer] last at [employer] - former/occasional smoker....smoked half a pack a day for 14 years... alcohol use (6 shots 3 times a week) currently trying to cut back.......denies drug use   FAMILY HX  - thyroid disease (mother and father) and father with pemphigus  - pemphigoid (father)

## 2024-03-22 NOTE — PHYSICAL EXAM
[General Appearance - Alert] : alert [General Appearance - In No Acute Distress] : in no acute distress [General Appearance - Well Nourished] : well nourished [General Appearance - Well Developed] : well developed [Outer Ear] : the ears and nose were normal in appearance [Oropharynx] : the oropharynx was normal [] : no respiratory distress [Respiration, Rhythm And Depth] : normal respiratory rhythm and effort [Auscultation Breath Sounds / Voice Sounds] : lungs were clear to auscultation bilaterally [Heart Rate And Rhythm] : heart rate was normal and rhythm regular [Heart Sounds] : normal S1 and S2 [Heart Sounds Gallop] : no gallops [Murmurs] : no murmurs [Heart Sounds Pericardial Friction Rub] : no pericardial rub [Cervical Lymph Nodes Enlarged Posterior Bilaterally] : posterior cervical [Cervical Lymph Nodes Enlarged Anterior Bilaterally] : anterior cervical [Supraclavicular Lymph Nodes Enlarged Bilaterally] : supraclavicular [No CVA Tenderness] : no ~M costovertebral angle tenderness [No Spinal Tenderness] : no spinal tenderness [No Focal Deficits] : no focal deficits [Impaired Insight] : insight and judgment were intact [Oriented To Time, Place, And Person] : oriented to person, place, and time [Affect] : the affect was normal [FreeTextEntry1] : well informed, good historian, reports depression under control but VERY frustrated with protracted sx

## 2024-03-22 NOTE — REVIEW OF SYSTEMS
[Abdominal Pain] : abdominal pain [Skin Lesions] : skin lesion [Depression] : depression [Negative] : Heme/Lymph [FreeTextEntry2] : up/ and down.. always struggled with wt  [FreeTextEntry3] : denies inflammatory eye  [FreeTextEntry7] : denies any GI issues including elevated liver enzymes....abdominal pain associated to medications (mycophenolate and MTX orally and injectables) [FreeTextEntry4] : dx oral lichen planus and oral ulcers [FreeTextEntry9] : inflammatory back pain noted  [FreeTextEntry8] : vaginal lichen planus; dyspareunia x many years  [de-identified] : not active now but describes as vesicular .. red/ photosensitive

## 2024-03-27 LAB
ALBUMIN SERPL ELPH-MCNC: 5.7 G/DL
ALP BLD-CCNC: 67 U/L
ALT SERPL-CCNC: 15 U/L
ANION GAP SERPL CALC-SCNC: 10 MMOL/L
AST SERPL-CCNC: 15 U/L
BILIRUB SERPL-MCNC: 0.3 MG/DL
BUN SERPL-MCNC: 11 MG/DL
CALCIUM SERPL-MCNC: 9.2 MG/DL
CHLORIDE SERPL-SCNC: 104 MMOL/L
CO2 SERPL-SCNC: 26 MMOL/L
CREAT SERPL-MCNC: 0.73 MG/DL
CRP SERPL-MCNC: <3 MG/L
EGFR: 107 ML/MIN/1.73M2
ERYTHROCYTE [SEDIMENTATION RATE] IN BLOOD BY WESTERGREN METHOD: 4 MM/HR
GLUCOSE SERPL-MCNC: 84 MG/DL
HCT VFR BLD CALC: 41 %
HGB BLD-MCNC: 13.2 G/DL
MCHC RBC-ENTMCNC: 29.5 PG
MCHC RBC-ENTMCNC: 32.2 GM/DL
MCV RBC AUTO: 91.7 FL
PLATELET # BLD AUTO: 250 K/UL
POTASSIUM SERPL-SCNC: 4.4 MMOL/L
PROT SERPL-MCNC: 6.5 G/DL
RBC # BLD: 4.47 M/UL
RBC # FLD: 14.6 %
SODIUM SERPL-SCNC: 140 MMOL/L
WBC # FLD AUTO: 6.17 K/UL

## 2024-04-12 RX ORDER — METHOTREXATE 15 MG/.3ML
15 INJECTION, SOLUTION SUBCUTANEOUS
Qty: 3 | Refills: 3 | Status: ACTIVE | COMMUNITY
Start: 2023-04-30

## 2024-06-20 ENCOUNTER — RX RENEWAL (OUTPATIENT)
Age: 39
End: 2024-06-20

## 2024-06-20 RX ORDER — FOLIC ACID 1 MG/1
1 TABLET ORAL
Qty: 90 | Refills: 3 | Status: ACTIVE | COMMUNITY
Start: 2023-03-20 | End: 1900-01-01

## 2024-07-11 ENCOUNTER — RX RENEWAL (OUTPATIENT)
Age: 39
End: 2024-07-11

## 2024-07-22 ENCOUNTER — APPOINTMENT (OUTPATIENT)
Dept: OTOLARYNGOLOGY | Facility: CLINIC | Age: 39
End: 2024-07-22

## 2024-07-31 ENCOUNTER — APPOINTMENT (OUTPATIENT)
Dept: UROGYNECOLOGY | Facility: CLINIC | Age: 39
End: 2024-07-31

## 2024-09-06 ENCOUNTER — APPOINTMENT (OUTPATIENT)
Dept: RHEUMATOLOGY | Facility: CLINIC | Age: 39
End: 2024-09-06

## 2024-09-06 VITALS
TEMPERATURE: 97.6 F | SYSTOLIC BLOOD PRESSURE: 108 MMHG | HEIGHT: 67 IN | WEIGHT: 218 LBS | DIASTOLIC BLOOD PRESSURE: 68 MMHG | HEART RATE: 78 BPM | BODY MASS INDEX: 34.21 KG/M2 | OXYGEN SATURATION: 98 %

## 2024-09-06 PROCEDURE — 90658 IIV3 VACCINE SPLT 0.5 ML IM: CPT

## 2024-09-06 PROCEDURE — 99213 OFFICE O/P EST LOW 20 MIN: CPT | Mod: 25

## 2024-09-06 PROCEDURE — G0008: CPT

## 2024-09-06 NOTE — PROCEDURE
[Today's Date:] : Date: [unfilled] [Risks] : risks [Benefits] : benefits [Alternatives] : alternatives [Consent Obtained] : written consent was obtained prior to the procedure and is detailed in the patient's record [Patient] : Prior to the start of the procedure a time out was taken and the identity of the patient was confirmed via name and date of birth with the patient. The correct site and the procedure to be performed were confirmed. The correct side was confirmed if applicable. The availability of the correct equipment was verified [Therapeutic] : therapeutic [#1 Site: ______] : #1 site identified in the [unfilled] [Alcohol] : alcohol [25 gauge 5/8  inch] : A 25 gauge 5/8 inch needle was used [Tolerated Well] : the patient tolerated the procedure well [No Complications] : there were no complications [Instructions Given] : handouts/patient instructions were given to patient [Patient Instructed to Call] : patient was instructed to call if redness at site, a decrease in range of motion or an increase in pain is noted after procedure. [de-identified] : Fluzone [FreeTextEntry1] : monitor for flulike sx or allergic rxn, take APAP as needed for first 3 days, if flulike sx persist call office

## 2024-09-06 NOTE — REVIEW OF SYSTEMS
[Abdominal Pain] : abdominal pain [Skin Lesions] : skin lesion [Depression] : depression [Negative] : Heme/Lymph [FreeTextEntry2] : up/ and down.. always struggled with wt  [FreeTextEntry3] : denies inflammatory eye  [FreeTextEntry4] : dx oral lichen planus and oral ulcers [FreeTextEntry7] : denies any GI issues including elevated liver enzymes....abdominal pain associated to medications (mycophenolate and MTX orally and injectables) [FreeTextEntry8] : vaginal lichen planus; dyspareunia x many years  [FreeTextEntry9] : inflammatory back pain noted  [de-identified] : not active now but describes as vesicular .. red/ photosensitive

## 2024-09-06 NOTE — PHYSICAL EXAM
[General Appearance - Alert] : alert [General Appearance - In No Acute Distress] : in no acute distress [General Appearance - Well Nourished] : well nourished [General Appearance - Well Developed] : well developed [Outer Ear] : the ears and nose were normal in appearance [Oropharynx] : the oropharynx was normal [] : no respiratory distress [Respiration, Rhythm And Depth] : normal respiratory rhythm and effort [Auscultation Breath Sounds / Voice Sounds] : lungs were clear to auscultation bilaterally [Heart Rate And Rhythm] : heart rate was normal and rhythm regular [Heart Sounds] : normal S1 and S2 [Heart Sounds Gallop] : no gallops [Murmurs] : no murmurs [Heart Sounds Pericardial Friction Rub] : no pericardial rub [Cervical Lymph Nodes Enlarged Posterior Bilaterally] : posterior cervical [Cervical Lymph Nodes Enlarged Anterior Bilaterally] : anterior cervical [Supraclavicular Lymph Nodes Enlarged Bilaterally] : supraclavicular [No CVA Tenderness] : no ~M costovertebral angle tenderness [No Spinal Tenderness] : no spinal tenderness [No Focal Deficits] : no focal deficits [Oriented To Time, Place, And Person] : oriented to person, place, and time [Impaired Insight] : insight and judgment were intact [Affect] : the affect was normal [FreeTextEntry1] : Nl Gait:  Joint exam reveals no evidence of active synovitis. There were no flexion contractures or deformities  in any joints. All joints have full ROM  without warmth, erythema, effusion or tenderness.

## 2024-09-06 NOTE — HISTORY OF PRESENT ILLNESS
[FreeTextEntry1] : 24  - on mycophenolate 500 mg tabs at 1500 mg daily  and rasuvo 15 mg - she has only noticed some improvement in her hair loss, no new oral ulcers, and her vaginal lesions have improved (per GYN- marked improvement) though pt still reporting considerable pain  - still has oral pain, abdominal pain, and vaginal pain associated w/ her hormone cycle ... worse with menses - lesion on top of mouth (hyperkeratosis, pre-cancerous) that has been excised but keeps coming back - made worse by social smoking - says that brushing teeth is painful as a result - got poison ivy twice over the summer, used a lot of topical steroid creams - needs revision after breast surgery - off all depression meds, doesn't feel any better but tired of it  - she has not been in touch w/ Dr. Noe for some time....she has called his office but no one has gotten back to her   - has a scheduled breast surgery 2024, breast reduction to alleviate upper back pain   Labs 24 - CMP fine  1) Lichen planus:  oral/ vaginal, severe for several ys.    Plan: - increasing celcel to 2500 or more, then lowering MTX dose - need to see CBC  ____________________________________________________________________  Initial HPI 3/15/2023  39 y/o f w/ a hx of thyroid disease, chronic sinusitis, heart murmur, depression, dyspareunia, raynauds, erosive lichen planus of vagina and oral lesions (oral dysplasia). She is presenting in office as an urgent visit for systemic treatment options for her oral and vaginal lichen planus after failing several topical therapies and HCQ.  She reports that the onset of her symptoms are always attributed to a pregnancy. She has been battling w/ chronic intermittent episodes of vaginal lichen planus since having her daughter in  and oral lichen planus since having her son in .  ORAL AND VAGINAL LICHEN PLANUS - c/o vaginal lesions, burning, and tearing  - has tried topical treatments vaginally that were not effective long term - pelvic floor exercises were also ineffective (for chronic pelvic pain because it caused additional vaginal tearing) - c/o painful sex and has refrained from the activity for a little over 3 years   - has had 3 oral debridement procedures (, , and ).......oral lesions return shortly afterwards - currently have some active sores on the side of her R tongue and upper gums, but also gets lesions throughout oral cavity and vaginal  - presented to Dr. Noe who had her on Plaquenil (dosage unknown) for over a year but did not notice much of a difference.....also tried doxycycline x 3 m and it was not tolerated (caused increased sun sensitivity) and didn't help - f/u w/ Dr. Noe every 6 months....he questioned if she had a hx of pemphigus (given + FH and suggestive systemic rash).  Has severe sensitivity to the sun....gets red vascular rash on her chest, shoulders, and hands that is very itchy and often burst releasing fluid  - rash persists for months unless she is on steroids and always responds..   - steroids resolve all her issues, but they return soon after she d/c - experiences a flares in combination w/each other when she is sick...during COVID she had an oral and vaginal flare at the same time  OTHER RHEUM INFO - presented to rheumatologist in the past and full work up came back neg for lupus, sjogrens, and rheumatoid factor (does not recall EDE)....positive HLA-B51 for Behcets  - most recent labs 2 weeks ago was all nl : CRP, CBC, CMP, hormones (did not do an ESR) + inflammatory back symptoms and BL recurrent tendinopathy knees   JOINTS  - has a hx of recurrent  bursitis in bl knees.....has not been active x many years - has chronic back pain (herniated discs and DJD throughout her back)......+ for sacroiliitis (XR reportedly confirm- need to review)..has undergone a variety of treatment options none of which were effective (steroid injection. epidurals, electrical stimulation device)  MOOD - has had depression since she was a teenager - manageable now despite chronic condition.....was severe post-partum - currently on Citalopram 30 mg (high doses caused her to become irate over the smallest things- premenstrual)  SOCIAL HX -  w/ 2 kids (M0)....does not plan on getting pregnant again - works as a  in a [employer] last at [employer] - former/occasional smoker....smoked half a pack a day for 14 years... alcohol use (6 shots 3 times a week) currently trying to cut back.......denies drug use   FAMILY HX  - thyroid disease (mother and father) and father with pemphigus  - pemphigoid (father)

## 2024-09-06 NOTE — ASSESSMENT
[FreeTextEntry1] : 37 y/o f w/ a hx of AITD (nl TFTs- no tx) , chronic recurrent sinusitis, murmur since childhood, depression x many ys w/ post partum, and years of chronic vaginal/ oral lesions- erosive dx w/ lichen planus with severe dyspareunia, raynauds mild, and chronic LBP/ pelvic pain Full rheum eval + HLAB51, ? imaging + sacroilitis, otherwise negative   1) Lichen Planus w/ + HLAB51 (bx otherwise not supportive of Behcet's): erosive oral/ vaginal. + response to steroids but when off immediately returns. Topical therapies and HCQ/ doxy of limited benefit. Frustrated with ongoing issues and chronic dyspareunia. Also hx of tendinopathy recurrently 1 /both knees and inflammatory back pain while denying inflammatory eye/ or bowel disease.  Follows with Dr Noe (needs updated eval)/ and Hannah Eaton Hx complicated by description of vesicular, photosensitive rash - sounds like pemphigus and has + FH along with genetic testing + for possible Behcet's (but path otherwise not suggestive of either)...updated study needed now Either way.. systemic therapy is warranted given impact on function/ QOL... - Has started MTX at 10 mg only fairly controlled - increase to  15 mg  SQ and added MMF now at 2000 mg.. (some GI distress but tolerable).will try to see if can increase to 3 gm daily.. if tolerated... .. GYN reports marked improvement in lesions- though tissue is friable and still ++ dyspareunia... NO new oral ulcers.. though stable persistent plaques.  Remains off prednisone.  Will continue to review previous w/u and consider repeat oral bx... trying to connect with oral pathologist.   Will continue with MTX at this point. Could also consider switching MTx to 100 mg AZA (concern for possible LFTs- struggles with wt for many ys and ETOH).. and lower doses unlikely to be effective; MMF 1 -2 gm... But if becomes more clear for either pemphigus/ or Behcet's may also consider drugs like RTX,. Family Planning:  has had vasectomy/ and not sexually active with intercourse at this time NOTE: - presented to rheumatologist in the past and full work up came back neg for lupus, sjogrens, and rheumatoid factor ....positive HLA-B51 for Behcets - HCQ x 12 m no improvement, well tolerated - Allergic reaction severe pruritus in response to Otezla after few wks - LEF not improvement after several months - DOxy x 3 m no improvement and worsening photosensitivity (intolerable)  2) Chronic LBP, pelvic pain and depression x many ys: fairly controlled on current regimen.. + inflammatory back symptoms and BL recurrent tendinopathy knees  3) Seronegative RA; joints as noted in past, nothing active at this time .   Plan: - complete labs every 3 months (orders on file)  - Administered shingles vaccine by RN - call next week to come in and receive - hold MTX for a week after any vaccine  - get RSV vaccine (bring us records) and covid vaccine soon  - try increasing mycophenolate to 3 grams by 500 mg as tolerated  - continue MTX injectables  - Continue Folic acid 1-3 mg daily on all the days you don't take MTX. Start at 1 mg and increase in having any SE.  - Call if you develop any infections (you may need to hold MTX based on need for antibiotics)  - RTO in 6 months

## 2024-09-13 ENCOUNTER — APPOINTMENT (OUTPATIENT)
Dept: RHEUMATOLOGY | Facility: CLINIC | Age: 39
End: 2024-09-13

## 2024-09-13 ENCOUNTER — APPOINTMENT (OUTPATIENT)
Dept: OTOLARYNGOLOGY | Facility: CLINIC | Age: 39
End: 2024-09-13
Payer: COMMERCIAL

## 2024-09-13 VITALS
SYSTOLIC BLOOD PRESSURE: 137 MMHG | RESPIRATION RATE: 18 BRPM | OXYGEN SATURATION: 99 % | DIASTOLIC BLOOD PRESSURE: 86 MMHG | HEART RATE: 68 BPM | TEMPERATURE: 98 F

## 2024-09-13 DIAGNOSIS — L43.8 OTHER LICHEN PLANUS: ICD-10-CM

## 2024-09-13 DIAGNOSIS — K13.70 UNSPECIFIED LESIONS OF ORAL MUCOSA: ICD-10-CM

## 2024-09-13 PROCEDURE — 90471 IMMUNIZATION ADMIN: CPT

## 2024-09-13 PROCEDURE — 99213 OFFICE O/P EST LOW 20 MIN: CPT

## 2024-09-13 PROCEDURE — 90750 HZV VACC RECOMBINANT IM: CPT

## 2024-09-13 NOTE — HISTORY OF PRESENT ILLNESS
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  Pt is here f/u for hx of leukoplakia and has no new lesion. Pt no longer f/u Dr. Noe. former smoker.

## 2024-09-19 ENCOUNTER — RX RENEWAL (OUTPATIENT)
Age: 39
End: 2024-09-19

## 2024-10-01 NOTE — H&P PST ADULT - NS PRO PASSIVE SMOKE EXP
It was a pleasure seeing you today, please reach out to our office directly if you have any concerns or questions about your heart.    Dr. Whaley would like you to return to the clinic in: As needed      What to bring to your next appointment:   Please make sure to bring your medication bottles to your next scheduled visit, this ensures that your medication list is up to date and accurate.  If you have a home blood pressure cuff that has never been checked for accuracy, please bring to appointment for staff to ensure cuff is accurate.       Please call us if you would like to be seen sooner for any reason  Please call our office with questions or concerns    Following items have been ordered:   STRESS TEST    Central scheduling will call you to schedule any diagnostic testing if ordered, please allow 3 business days  We will call you with results    Dr. Whaley would like you to:  No medication changes today        
No

## 2024-11-09 ENCOUNTER — RX RENEWAL (OUTPATIENT)
Age: 39
End: 2024-11-09

## 2025-01-02 NOTE — ASU DISCHARGE PLAN (ADULT/PEDIATRIC) - DISCHARGE TO
Rx Refill Note  Requested Prescriptions     Pending Prescriptions Disp Refills    amphetamine-dextroamphetamine (ADDERALL) 10 MG tablet 60 tablet 0     Sig: Take 1 tablet by mouth 2 (Two) Times a Day.      Last office visit with prescribing clinician: 9/9/2024   Last telemedicine visit with prescribing clinician: Visit date not found   Next office visit with prescribing clinician: 3/11/2025                         Would you like a call back once the refill request has been completed: [] Yes [] No    If the office needs to give you a call back, can they leave a voicemail: [] Yes [] No    Melida Roberts MA  01/02/25, 11:24 EST  
Home

## 2025-03-10 ENCOUNTER — LABORATORY RESULT (OUTPATIENT)
Age: 40
End: 2025-03-10

## 2025-03-11 ENCOUNTER — APPOINTMENT (OUTPATIENT)
Dept: RHEUMATOLOGY | Facility: CLINIC | Age: 40
End: 2025-03-11

## 2025-03-11 VITALS
WEIGHT: 225 LBS | BODY MASS INDEX: 35.31 KG/M2 | SYSTOLIC BLOOD PRESSURE: 118 MMHG | DIASTOLIC BLOOD PRESSURE: 74 MMHG | TEMPERATURE: 97.6 F | OXYGEN SATURATION: 98 % | HEART RATE: 88 BPM | HEIGHT: 67 IN

## 2025-03-11 PROCEDURE — G2211 COMPLEX E/M VISIT ADD ON: CPT | Mod: NC

## 2025-03-11 PROCEDURE — 99214 OFFICE O/P EST MOD 30 MIN: CPT

## 2025-03-11 RX ORDER — LAMOTRIGINE 200 MG/1
200 TABLET ORAL
Refills: 0 | Status: ACTIVE | COMMUNITY

## 2025-04-07 ENCOUNTER — RX RENEWAL (OUTPATIENT)
Age: 40
End: 2025-04-07

## 2025-04-08 ENCOUNTER — APPOINTMENT (OUTPATIENT)
Dept: UROGYNECOLOGY | Facility: CLINIC | Age: 40
End: 2025-04-08
Payer: COMMERCIAL

## 2025-04-08 VITALS
SYSTOLIC BLOOD PRESSURE: 125 MMHG | HEART RATE: 77 BPM | DIASTOLIC BLOOD PRESSURE: 81 MMHG | BODY MASS INDEX: 35.31 KG/M2 | OXYGEN SATURATION: 98 % | WEIGHT: 225 LBS | HEIGHT: 67 IN

## 2025-04-08 DIAGNOSIS — L43.8 OTHER LICHEN PLANUS: ICD-10-CM

## 2025-04-08 DIAGNOSIS — M79.18 MYALGIA, OTHER SITE: ICD-10-CM

## 2025-04-08 DIAGNOSIS — R10.2 PELVIC AND PERINEAL PAIN: ICD-10-CM

## 2025-04-08 DIAGNOSIS — N89.8 OTHER SPECIFIED NONINFLAMMATORY DISORDERS OF VAGINA: ICD-10-CM

## 2025-04-08 DIAGNOSIS — N94.10 UNSPECIFIED DYSPAREUNIA: ICD-10-CM

## 2025-04-08 DIAGNOSIS — N94.819 VULVODYNIA, UNSPECIFIED: ICD-10-CM

## 2025-04-08 PROCEDURE — 87210 SMEAR WET MOUNT SALINE/INK: CPT | Mod: QW

## 2025-04-08 PROCEDURE — 99215 OFFICE O/P EST HI 40 MIN: CPT

## 2025-04-08 PROCEDURE — 83986 ASSAY PH BODY FLUID NOS: CPT | Mod: QW

## 2025-04-08 RX ORDER — METRONIDAZOLE 7.5 MG/G
0.75 GEL VAGINAL
Qty: 1 | Refills: 0 | Status: ACTIVE | COMMUNITY
Start: 2025-04-08 | End: 1900-01-01

## 2025-04-14 ENCOUNTER — TRANSCRIPTION ENCOUNTER (OUTPATIENT)
Age: 40
End: 2025-04-14

## 2025-06-10 ENCOUNTER — LABORATORY RESULT (OUTPATIENT)
Age: 40
End: 2025-06-10

## 2025-07-21 ENCOUNTER — TRANSCRIPTION ENCOUNTER (OUTPATIENT)
Age: 40
End: 2025-07-21

## 2025-08-25 ENCOUNTER — APPOINTMENT (OUTPATIENT)
Dept: OTOLARYNGOLOGY | Facility: CLINIC | Age: 40
End: 2025-08-25
Payer: COMMERCIAL

## 2025-08-25 VITALS
HEIGHT: 67 IN | OXYGEN SATURATION: 98 % | SYSTOLIC BLOOD PRESSURE: 116 MMHG | RESPIRATION RATE: 18 BRPM | WEIGHT: 210 LBS | DIASTOLIC BLOOD PRESSURE: 79 MMHG | HEART RATE: 79 BPM | BODY MASS INDEX: 32.96 KG/M2

## 2025-08-25 DIAGNOSIS — L43.8 OTHER LICHEN PLANUS: ICD-10-CM

## 2025-08-25 DIAGNOSIS — K13.70 UNSPECIFIED LESIONS OF ORAL MUCOSA: ICD-10-CM

## 2025-08-25 PROCEDURE — 99213 OFFICE O/P EST LOW 20 MIN: CPT

## 2025-09-03 ENCOUNTER — RX RENEWAL (OUTPATIENT)
Age: 40
End: 2025-09-03

## 2025-09-11 ENCOUNTER — NON-APPOINTMENT (OUTPATIENT)
Age: 40
End: 2025-09-11

## 2025-09-15 ENCOUNTER — NON-APPOINTMENT (OUTPATIENT)
Age: 40
End: 2025-09-15

## 2025-09-16 ENCOUNTER — APPOINTMENT (OUTPATIENT)
Dept: RHEUMATOLOGY | Facility: CLINIC | Age: 40
End: 2025-09-16
Payer: COMMERCIAL

## 2025-09-16 VITALS
TEMPERATURE: 98.3 F | OXYGEN SATURATION: 99 % | HEIGHT: 67 IN | HEART RATE: 79 BPM | BODY MASS INDEX: 32.02 KG/M2 | DIASTOLIC BLOOD PRESSURE: 72 MMHG | SYSTOLIC BLOOD PRESSURE: 106 MMHG | WEIGHT: 204 LBS

## 2025-09-16 DIAGNOSIS — F32.A DEPRESSION, UNSPECIFIED: ICD-10-CM

## 2025-09-16 DIAGNOSIS — R10.2 PELVIC AND PERINEAL PAIN: ICD-10-CM

## 2025-09-16 DIAGNOSIS — M35.2 BEHCET'S DISEASE: ICD-10-CM

## 2025-09-16 DIAGNOSIS — Z79.631 LONG TERM (CURRENT) USE OF ANTIMETABOLITE AGENT: ICD-10-CM

## 2025-09-16 DIAGNOSIS — L43.8 OTHER LICHEN PLANUS: ICD-10-CM

## 2025-09-16 DIAGNOSIS — M54.16 RADICULOPATHY, LUMBAR REGION: ICD-10-CM

## 2025-09-16 PROCEDURE — 99214 OFFICE O/P EST MOD 30 MIN: CPT

## 2025-09-16 PROCEDURE — G2211 COMPLEX E/M VISIT ADD ON: CPT | Mod: NC

## 2025-09-16 RX ORDER — LAMOTRIGINE 200 MG/1
200 TABLET ORAL
Refills: 0 | Status: ACTIVE | COMMUNITY